# Patient Record
Sex: FEMALE | Race: WHITE | Employment: OTHER | URBAN - METROPOLITAN AREA
[De-identification: names, ages, dates, MRNs, and addresses within clinical notes are randomized per-mention and may not be internally consistent; named-entity substitution may affect disease eponyms.]

---

## 2017-01-10 LAB
ALBUMIN SERPL-MCNC: 4.4 G/DL (ref 3.2–5.3)
ALK PHOSPHATASE: 67 IU/L (ref 35–121)
ALT SERPL-CCNC: 17 IU/L (ref 5–59)
ANION GAP SERPL CALCULATED.3IONS-SCNC: 15 MMOL/L
AST SERPL-CCNC: 17 IU/L (ref 10–42)
BILIRUB SERPL-MCNC: 0.4 MG/DL (ref 0.2–1.3)
BUN BLDV-MCNC: 11 MG/DL (ref 10–20)
CALCIUM SERPL-MCNC: 9.8 MG/DL (ref 8.7–10.8)
CHLORIDE BLD-SCNC: 101 MMOL/L (ref 95–111)
CO2: 30 MMOL/L (ref 21–32)
CREAT SERPL-MCNC: 0.8 MG/DL (ref 0.5–1.3)
EGFR AFRICAN AMERICAN: 86
EGFR IF NONAFRICAN AMERICAN: 71
GLUCOSE: 122 MG/DL (ref 70–100)
POTASSIUM SERPL-SCNC: 4 MMOL/L (ref 3.5–5.4)
SODIUM BLD-SCNC: 142 MMOL/L (ref 134–147)
TOTAL PROTEIN: 6.9 G/DL (ref 5.8–8)

## 2017-07-05 ENCOUNTER — TELEPHONE (OUTPATIENT)
Dept: FAMILY MEDICINE CLINIC | Age: 72
End: 2017-07-05

## 2017-07-05 DIAGNOSIS — E78.00 HIGH CHOLESTEROL: Chronic | ICD-10-CM

## 2017-07-05 DIAGNOSIS — E03.9 ACQUIRED HYPOTHYROIDISM: Primary | Chronic | ICD-10-CM

## 2017-07-19 LAB
ABSOLUTE BASO #: 0.1 K/UL (ref 0–0.1)
ABSOLUTE EOS #: 0.2 K/UL (ref 0.1–0.4)
ABSOLUTE LYMPH #: 1.5 K/UL (ref 0.8–5.2)
ABSOLUTE MONO #: 0.6 K/UL (ref 0.1–0.9)
ABSOLUTE NEUT #: 4.4 K/UL (ref 1.3–9.1)
ALBUMIN SERPL-MCNC: 4.5 G/DL (ref 3.2–5.3)
ALK PHOSPHATASE: 62 IU/L (ref 35–121)
ALT SERPL-CCNC: 13 IU/L (ref 5–59)
ANION GAP SERPL CALCULATED.3IONS-SCNC: 11 MMOL/L
AST SERPL-CCNC: 14 IU/L (ref 10–42)
BASOPHILS RELATIVE PERCENT: 1.2 %
BILIRUB SERPL-MCNC: 0.5 MG/DL (ref 0.2–1.3)
BUN BLDV-MCNC: 11 MG/DL (ref 10–20)
CALCIUM SERPL-MCNC: 9.4 MG/DL (ref 8.7–10.8)
CHLORIDE BLD-SCNC: 105 MMOL/L (ref 95–111)
CHOLESTEROL/HDL RATIO: 3
CHOLESTEROL: 175 MG/DL
CO2: 29 MMOL/L (ref 21–32)
CREAT SERPL-MCNC: 0.8 MG/DL (ref 0.5–1.3)
EGFR AFRICAN AMERICAN: 86
EGFR IF NONAFRICAN AMERICAN: 71
EOSINOPHILS RELATIVE PERCENT: 2.4 %
GLUCOSE: 93 MG/DL (ref 70–100)
HCT VFR BLD CALC: 40.8 % (ref 36–48)
HDLC SERPL-MCNC: 59 MG/DL (ref 40–60)
HEMOGLOBIN: 13.5 G/DL (ref 12–16)
LDL CHOLESTEROL CALCULATED: 99 MG/DL
LDL/HDL RATIO: 1.7
LYMPHOCYTE %: 21.8 %
MCH RBC QN AUTO: 29.1 PG (ref 27–34)
MCHC RBC AUTO-ENTMCNC: 33.1 G/DL (ref 31–36)
MCV RBC AUTO: 87.9 FL (ref 80–100)
MONOCYTES # BLD: 8.7 %
NEUTROPHILS RELATIVE PERCENT: 65.6 %
PDW BLD-RTO: 13.1 % (ref 10.8–14.8)
PLATELETS: 236 K/UL (ref 150–450)
POTASSIUM SERPL-SCNC: 4.4 MMOL/L (ref 3.5–5.4)
RBC: 4.64 M/UL (ref 4–5.5)
SODIUM BLD-SCNC: 141 MMOL/L (ref 134–147)
T4 FREE: 0.96 NG/DL (ref 0.8–1.8)
TOTAL PROTEIN: 6.7 G/DL (ref 5.8–8)
TRIGL SERPL-MCNC: 87 MG/DL
TSH SERPL DL<=0.05 MIU/L-ACNC: 4.99 UIU/ML (ref 0.4–4.4)
VLDLC SERPL CALC-MCNC: 17 MG/DL
WBC: 6.8 K/UL (ref 3.7–10.8)

## 2017-07-20 ENCOUNTER — OFFICE VISIT (OUTPATIENT)
Dept: FAMILY MEDICINE CLINIC | Age: 72
End: 2017-07-20
Payer: MEDICARE

## 2017-07-20 VITALS
OXYGEN SATURATION: 97 % | WEIGHT: 217.6 LBS | BODY MASS INDEX: 34.15 KG/M2 | DIASTOLIC BLOOD PRESSURE: 72 MMHG | SYSTOLIC BLOOD PRESSURE: 134 MMHG | HEART RATE: 85 BPM | HEIGHT: 67 IN

## 2017-07-20 DIAGNOSIS — Z12.39 BREAST CANCER SCREENING: ICD-10-CM

## 2017-07-20 DIAGNOSIS — Z00.00 ROUTINE GENERAL MEDICAL EXAMINATION AT A HEALTH CARE FACILITY: ICD-10-CM

## 2017-07-20 DIAGNOSIS — Z12.31 ENCOUNTER FOR SCREENING MAMMOGRAM FOR MALIGNANT NEOPLASM OF BREAST: ICD-10-CM

## 2017-07-20 DIAGNOSIS — E03.9 ACQUIRED HYPOTHYROIDISM: Primary | ICD-10-CM

## 2017-07-20 PROCEDURE — G0439 PPPS, SUBSEQ VISIT: HCPCS | Performed by: FAMILY MEDICINE

## 2017-07-20 PROCEDURE — G0009 ADMIN PNEUMOCOCCAL VACCINE: HCPCS | Performed by: FAMILY MEDICINE

## 2017-07-20 PROCEDURE — 90732 PPSV23 VACC 2 YRS+ SUBQ/IM: CPT | Performed by: FAMILY MEDICINE

## 2017-07-20 ASSESSMENT — PATIENT HEALTH QUESTIONNAIRE - PHQ9: SUM OF ALL RESPONSES TO PHQ QUESTIONS 1-9: 0

## 2017-08-01 RX ORDER — PRAVASTATIN SODIUM 20 MG
20 TABLET ORAL NIGHTLY
Qty: 30 TABLET | Refills: 11 | Status: SHIPPED | OUTPATIENT
Start: 2017-08-01 | End: 2018-05-29 | Stop reason: SDUPTHER

## 2017-08-01 RX ORDER — LEVOTHYROXINE SODIUM 0.05 MG/1
50 TABLET ORAL DAILY
Qty: 30 TABLET | Refills: 11 | Status: SHIPPED | OUTPATIENT
Start: 2017-08-01 | End: 2018-05-29 | Stop reason: SDUPTHER

## 2018-01-16 DIAGNOSIS — Z12.39 BREAST CANCER SCREENING: ICD-10-CM

## 2018-01-16 DIAGNOSIS — Z12.31 ENCOUNTER FOR SCREENING MAMMOGRAM FOR MALIGNANT NEOPLASM OF BREAST: ICD-10-CM

## 2018-02-01 LAB
T4 FREE: 0.96 NG/DL (ref 0.8–1.8)
TSH SERPL DL<=0.05 MIU/L-ACNC: 5.51 UIU/ML (ref 0.4–4.4)

## 2018-02-02 DIAGNOSIS — E03.9 ACQUIRED HYPOTHYROIDISM: Primary | Chronic | ICD-10-CM

## 2018-03-15 ENCOUNTER — TELEPHONE (OUTPATIENT)
Dept: FAMILY MEDICINE CLINIC | Age: 73
End: 2018-03-15

## 2018-05-29 RX ORDER — LEVOTHYROXINE SODIUM 0.05 MG/1
TABLET ORAL
Qty: 90 TABLET | Refills: 10 | Status: SHIPPED | OUTPATIENT
Start: 2018-05-29 | End: 2019-05-28 | Stop reason: SDUPTHER

## 2018-05-29 RX ORDER — PRAVASTATIN SODIUM 20 MG
TABLET ORAL
Qty: 90 TABLET | Refills: 10 | Status: SHIPPED | OUTPATIENT
Start: 2018-05-29 | End: 2019-07-29 | Stop reason: SDUPTHER

## 2018-06-28 ENCOUNTER — OFFICE VISIT (OUTPATIENT)
Dept: FAMILY MEDICINE CLINIC | Age: 73
End: 2018-06-28
Payer: MEDICARE

## 2018-06-28 VITALS
DIASTOLIC BLOOD PRESSURE: 80 MMHG | WEIGHT: 213.5 LBS | OXYGEN SATURATION: 96 % | BODY MASS INDEX: 33.19 KG/M2 | SYSTOLIC BLOOD PRESSURE: 134 MMHG | HEART RATE: 94 BPM

## 2018-06-28 DIAGNOSIS — M25.511 ACUTE PAIN OF RIGHT SHOULDER: Primary | ICD-10-CM

## 2018-06-28 DIAGNOSIS — E03.9 ACQUIRED HYPOTHYROIDISM: ICD-10-CM

## 2018-06-28 PROCEDURE — 99213 OFFICE O/P EST LOW 20 MIN: CPT | Performed by: FAMILY MEDICINE

## 2018-06-28 PROCEDURE — G8427 DOCREV CUR MEDS BY ELIG CLIN: HCPCS | Performed by: FAMILY MEDICINE

## 2018-06-28 PROCEDURE — 4040F PNEUMOC VAC/ADMIN/RCVD: CPT | Performed by: FAMILY MEDICINE

## 2018-06-28 PROCEDURE — 1090F PRES/ABSN URINE INCON ASSESS: CPT | Performed by: FAMILY MEDICINE

## 2018-06-28 PROCEDURE — 1123F ACP DISCUSS/DSCN MKR DOCD: CPT | Performed by: FAMILY MEDICINE

## 2018-06-28 PROCEDURE — G8399 PT W/DXA RESULTS DOCUMENT: HCPCS | Performed by: FAMILY MEDICINE

## 2018-06-28 PROCEDURE — 3017F COLORECTAL CA SCREEN DOC REV: CPT | Performed by: FAMILY MEDICINE

## 2018-06-28 PROCEDURE — G8417 CALC BMI ABV UP PARAM F/U: HCPCS | Performed by: FAMILY MEDICINE

## 2018-06-28 PROCEDURE — 1036F TOBACCO NON-USER: CPT | Performed by: FAMILY MEDICINE

## 2018-06-30 LAB — T4 FREE: 1.13 NG/DL (ref 0.8–1.9)

## 2018-08-28 ENCOUNTER — TELEPHONE (OUTPATIENT)
Dept: FAMILY MEDICINE CLINIC | Age: 73
End: 2018-08-28

## 2018-08-31 NOTE — TELEPHONE ENCOUNTER
Patient was informed that forms are being mailed out today for her to fill out. Once the forms are filled out she is to being them into the office. Once we receive the forms then we can schedule her appointment. Patient verbally understood.

## 2018-09-18 ENCOUNTER — OFFICE VISIT (OUTPATIENT)
Dept: FAMILY MEDICINE CLINIC | Age: 73
End: 2018-09-18
Payer: MEDICARE

## 2018-09-18 VITALS
HEART RATE: 101 BPM | SYSTOLIC BLOOD PRESSURE: 130 MMHG | WEIGHT: 210 LBS | OXYGEN SATURATION: 96 % | BODY MASS INDEX: 33.75 KG/M2 | HEIGHT: 66 IN | DIASTOLIC BLOOD PRESSURE: 80 MMHG

## 2018-09-18 DIAGNOSIS — E03.9 ACQUIRED HYPOTHYROIDISM: ICD-10-CM

## 2018-09-18 DIAGNOSIS — E78.5 HYPERLIPIDEMIA WITH TARGET LDL LESS THAN 130: ICD-10-CM

## 2018-09-18 DIAGNOSIS — Z00.00 WELL ADULT EXAM: Primary | ICD-10-CM

## 2018-09-18 PROCEDURE — G0439 PPPS, SUBSEQ VISIT: HCPCS | Performed by: FAMILY MEDICINE

## 2018-09-18 PROCEDURE — 4040F PNEUMOC VAC/ADMIN/RCVD: CPT | Performed by: FAMILY MEDICINE

## 2018-09-18 RX ORDER — MULTIVITAMIN WITH IRON
250 TABLET ORAL DAILY
COMMUNITY
End: 2019-08-26

## 2018-09-22 LAB
ABSOLUTE BASO #: 0.1 K/UL (ref 0–0.1)
ABSOLUTE EOS #: 0.2 K/UL (ref 0.1–0.4)
ABSOLUTE LYMPH #: 1.7 K/UL (ref 0.8–5.2)
ABSOLUTE MONO #: 0.7 K/UL (ref 0.1–0.9)
ABSOLUTE NEUT #: 4.4 K/UL (ref 1.3–9.1)
ALBUMIN SERPL-MCNC: 4.2 G/DL (ref 3.5–5.2)
ALK PHOSPHATASE: 61 U/L (ref 30–146)
ALT SERPL-CCNC: 12 U/L (ref 5–40)
ANION GAP SERPL CALCULATED.3IONS-SCNC: 15 MEQ/L (ref 10–19)
AST SERPL-CCNC: 16 U/L (ref 9–40)
BASOPHILS RELATIVE PERCENT: 0.9 %
BILIRUB SERPL-MCNC: 0.4 MG/DL
BUN BLDV-MCNC: 15 MG/DL (ref 8–23)
CALCIUM SERPL-MCNC: 9.1 MG/DL (ref 8.5–10.5)
CHLORIDE BLD-SCNC: 102 MEQ/L (ref 95–107)
CHOLESTEROL/HDL RATIO: 3.6
CHOLESTEROL: 195 MG/DL
CO2: 26 MEQ/L (ref 19–31)
CREAT SERPL-MCNC: 0.8 MG/DL (ref 0.6–1.3)
EGFR AFRICAN AMERICAN: 86 ML/MIN/1.73 M2
EGFR IF NONAFRICAN AMERICAN: 74.2 ML/MIN/1.73 M2
EOSINOPHILS RELATIVE PERCENT: 2.2 %
GLUCOSE: 92 MG/DL (ref 70–99)
HCT VFR BLD CALC: 39.4 % (ref 36–48)
HDLC SERPL-MCNC: 54.3 MG/DL
HEMOGLOBIN: 13.6 G/DL (ref 12–16)
LDL CHOLESTEROL CALCULATED: 120 MG/DL
LDL/HDL RATIO: 2.2
LYMPHOCYTE %: 24.8 %
MCH RBC QN AUTO: 30 PG (ref 27–34)
MCHC RBC AUTO-ENTMCNC: 34.5 G/DL (ref 31–36)
MCV RBC AUTO: 87 FL (ref 80–100)
MONOCYTES # BLD: 9.6 %
NEUTROPHILS RELATIVE PERCENT: 62.4 %
PDW BLD-RTO: 13.1 % (ref 10.8–14.8)
PLATELETS: 236 K/UL (ref 150–450)
POTASSIUM SERPL-SCNC: 4.4 MEQ/L (ref 3.5–5.4)
RBC: 4.53 M/UL (ref 4–5.5)
SODIUM BLD-SCNC: 143 MEQ/L (ref 135–146)
T4 FREE: 1.26 NG/DL (ref 0.8–1.9)
TOTAL PROTEIN: 6.6 G/DL (ref 6.1–8.3)
TRIGL SERPL-MCNC: 106 MG/DL
VLDLC SERPL CALC-MCNC: 21 MG/DL
WBC: 7 K/UL (ref 3.7–10.8)

## 2019-05-28 RX ORDER — LEVOTHYROXINE SODIUM 0.05 MG/1
TABLET ORAL
Qty: 90 TABLET | Refills: 10 | Status: SHIPPED | OUTPATIENT
Start: 2019-05-28 | End: 2019-07-29 | Stop reason: SDUPTHER

## 2019-07-29 NOTE — TELEPHONE ENCOUNTER
Patient request medication refill. Patient is out of 1 medication. Patient set up apt for Mon 8/05/19; Dr Montano's soonest available. Last OV: 6/28/18  Next OV: 8/05/19    Patient also request an order for lab work to be completed before upcoming apt on Mon 8/05/19. Please call patient with update.

## 2019-07-30 RX ORDER — LEVOTHYROXINE SODIUM 0.05 MG/1
TABLET ORAL
Qty: 90 TABLET | Refills: 0 | Status: SHIPPED | OUTPATIENT
Start: 2019-07-30 | End: 2020-02-05 | Stop reason: SDUPTHER

## 2019-07-30 RX ORDER — PRAVASTATIN SODIUM 20 MG
TABLET ORAL
Qty: 90 TABLET | Refills: 0 | Status: SHIPPED | OUTPATIENT
Start: 2019-07-30 | End: 2019-10-29 | Stop reason: SDUPTHER

## 2019-07-30 NOTE — TELEPHONE ENCOUNTER
Next Visit Date:  Future Appointments   Date Time Provider Matilde Maravillai   8/5/2019  2:40 PM Srinivasa Chavez  [address] [facility]   Topic Date Due    DTaP/Tdap/Td vaccine (1 - Tdap) 02/21/1966    Shingles Vaccine (1 of 2) 02/21/1997    Pneumococcal 65+ years Vaccine (2 of 2 - PCV13) 07/20/2018    TSH testing  02/01/2019    Flu vaccine (1) 09/30/2019 (Originally 9/1/2019)    Annual Wellness Visit (AWV)  09/18/2019    Colon cancer screen colonoscopy  01/01/2020    Breast cancer screen  01/04/2020    Lipid screen  09/21/2023    DEXA (modify frequency per FRAX score)  Completed    Hepatitis C screen  Completed       No results found for: LABA1C          ( goal A1C is < 7)   No results found for: LABMICR  LDL Cholesterol (mg/dL)   Date Value   06/15/2012 139 (H)     LDL Calculated (mg/dL)   Date Value   09/21/2018 120   07/18/2017 99       (goal LDL is <100)   AST (U/L)   Date Value   09/21/2018 16     ALT (U/L)   Date Value   09/21/2018 12     BUN (mg/dL)   Date Value   09/21/2018 15     BP Readings from Last 3 Encounters:   09/18/18 130/80   06/28/18 134/80   07/20/17 134/72          (goal 120/80)    All Future Testing planned in [facility]  Lab Frequency Next Occurrence   CBC Auto Differential Once 09/18/2018   Comprehensive Metabolic Panel Once 60/98/8227   Lipid Panel Once 09/18/2018   T4, Free Once 09/18/2018               Patient Active Problem List:     High cholesterol     Hyperlipidemia with target LDL less than 130     Acquired hypothyroidism

## 2019-08-05 ENCOUNTER — OFFICE VISIT (OUTPATIENT)
Dept: FAMILY MEDICINE CLINIC | Age: 74
End: 2019-08-05
Payer: MEDICARE

## 2019-08-05 VITALS
OXYGEN SATURATION: 93 % | RESPIRATION RATE: 18 BRPM | DIASTOLIC BLOOD PRESSURE: 70 MMHG | WEIGHT: 213.8 LBS | HEART RATE: 78 BPM | BODY MASS INDEX: 34.51 KG/M2 | SYSTOLIC BLOOD PRESSURE: 132 MMHG

## 2019-08-05 DIAGNOSIS — E03.9 ACQUIRED HYPOTHYROIDISM: ICD-10-CM

## 2019-08-05 DIAGNOSIS — E78.5 HYPERLIPIDEMIA WITH TARGET LDL LESS THAN 130: Primary | ICD-10-CM

## 2019-08-05 PROCEDURE — 3017F COLORECTAL CA SCREEN DOC REV: CPT | Performed by: FAMILY MEDICINE

## 2019-08-05 PROCEDURE — 1123F ACP DISCUSS/DSCN MKR DOCD: CPT | Performed by: FAMILY MEDICINE

## 2019-08-05 PROCEDURE — 4040F PNEUMOC VAC/ADMIN/RCVD: CPT | Performed by: FAMILY MEDICINE

## 2019-08-05 PROCEDURE — 90471 IMMUNIZATION ADMIN: CPT | Performed by: FAMILY MEDICINE

## 2019-08-05 PROCEDURE — 1090F PRES/ABSN URINE INCON ASSESS: CPT | Performed by: FAMILY MEDICINE

## 2019-08-05 PROCEDURE — G8427 DOCREV CUR MEDS BY ELIG CLIN: HCPCS | Performed by: FAMILY MEDICINE

## 2019-08-05 PROCEDURE — G8417 CALC BMI ABV UP PARAM F/U: HCPCS | Performed by: FAMILY MEDICINE

## 2019-08-05 PROCEDURE — 99214 OFFICE O/P EST MOD 30 MIN: CPT | Performed by: FAMILY MEDICINE

## 2019-08-05 PROCEDURE — G8399 PT W/DXA RESULTS DOCUMENT: HCPCS | Performed by: FAMILY MEDICINE

## 2019-08-05 PROCEDURE — 90715 TDAP VACCINE 7 YRS/> IM: CPT | Performed by: FAMILY MEDICINE

## 2019-08-05 PROCEDURE — 1036F TOBACCO NON-USER: CPT | Performed by: FAMILY MEDICINE

## 2019-08-05 ASSESSMENT — PATIENT HEALTH QUESTIONNAIRE - PHQ9
SUM OF ALL RESPONSES TO PHQ QUESTIONS 1-9: 0
2. FEELING DOWN, DEPRESSED OR HOPELESS: 0
SUM OF ALL RESPONSES TO PHQ9 QUESTIONS 1 & 2: 0
1. LITTLE INTEREST OR PLEASURE IN DOING THINGS: 0
SUM OF ALL RESPONSES TO PHQ QUESTIONS 1-9: 0

## 2019-08-19 LAB
ABSOLUTE BASO #: 0.1 X10E9/L (ref 0–0.9)
ABSOLUTE EOS #: 0.2 X10E9/L (ref 0–0.4)
ABSOLUTE LYMPH #: 1.3 X10E9/L (ref 1–3.5)
ABSOLUTE MONO #: 0.6 X10E9/L (ref 0–0.9)
ABSOLUTE NEUT #: 3.4 X10E9/L (ref 1.5–6.6)
ALBUMIN SERPL-MCNC: 4 G/DL (ref 3.2–5.3)
ALK PHOSPHATASE: 50 U/L (ref 39–130)
ALT SERPL-CCNC: 13 U/L (ref 0–31)
ANION GAP SERPL CALCULATED.3IONS-SCNC: 9 MMOL/L (ref 4–12)
AST SERPL-CCNC: 14 U/L (ref 0–41)
BASOPHILS RELATIVE PERCENT: 1.8 %
BILIRUB SERPL-MCNC: 0.5 MG/DL (ref 0.3–1.2)
BUN BLDV-MCNC: 12 MG/DL (ref 5–27)
CALCIUM SERPL-MCNC: 8.5 MG/DL (ref 8.5–10.5)
CHLORIDE BLD-SCNC: 105 MMOL/L (ref 98–109)
CHOLESTEROL/HDL RATIO: 3.7 (ref 1–5)
CHOLESTEROL: 204 MG/DL (ref 150–200)
CO2: 27 MMOL/L (ref 22–32)
CREAT SERPL-MCNC: 0.81 MG/DL (ref 0.4–1)
EGFR AFRICAN AMERICAN: >60 ML/MIN/1.73SQ.M
EGFR IF NONAFRICAN AMERICAN: >60 ML/MIN/1.73SQ.M
EOSINOPHILS RELATIVE PERCENT: 2.9 %
GLUCOSE: 87 MG/DL (ref 65–99)
HCT VFR BLD CALC: 41.6 % (ref 35–47)
HDLC SERPL-MCNC: 55 MG/DL
HEMOGLOBIN: 13.8 G/DL (ref 11.7–16.1)
LDL CHOLESTEROL CALCULATED: 123 MG/DL
LDL/HDL RATIO: 2.2
LYMPHOCYTE %: 23.8 %
MCH RBC QN AUTO: 30.5 PG (ref 27–35)
MCHC RBC AUTO-ENTMCNC: 33.3 G/DL (ref 31–36)
MCV RBC AUTO: 91 FL (ref 81–101)
MONOCYTES # BLD: 10.6 %
NEUTROPHILS RELATIVE PERCENT: 60.9 %
PDW BLD-RTO: 14.2 % (ref 11.5–14.7)
PLATELETS: 203 X10E9/L (ref 150–450)
PMV BLD AUTO: 9.3 FL (ref 7–12)
POTASSIUM SERPL-SCNC: 4.4 MMOL/L (ref 3.5–5)
RBC: 4.55 X10E12/L (ref 3.55–5.2)
SODIUM BLD-SCNC: 141 MMOL/L (ref 134–146)
T4 FREE: 0.98 NG/DL (ref 0.61–1.6)
TOTAL PROTEIN: 6.1 G/DL (ref 6–8)
TRIGL SERPL-MCNC: 130 MG/DL (ref 27–150)
TSH SERPL DL<=0.05 MIU/L-ACNC: 4.91 UIU/ML (ref 0.49–4.67)
VLDLC SERPL CALC-MCNC: 26 MG/DL (ref 0–30)
WBC: 5.5 X10E9/L (ref 4–11.8)

## 2019-08-26 ENCOUNTER — OFFICE VISIT (OUTPATIENT)
Dept: FAMILY MEDICINE CLINIC | Age: 74
End: 2019-08-26
Payer: MEDICARE

## 2019-08-26 VITALS
SYSTOLIC BLOOD PRESSURE: 126 MMHG | BODY MASS INDEX: 34.72 KG/M2 | OXYGEN SATURATION: 97 % | HEART RATE: 86 BPM | DIASTOLIC BLOOD PRESSURE: 80 MMHG | WEIGHT: 215.13 LBS

## 2019-08-26 DIAGNOSIS — R20.0 NUMBNESS OF LEFT HAND: Primary | ICD-10-CM

## 2019-08-26 PROCEDURE — 1090F PRES/ABSN URINE INCON ASSESS: CPT | Performed by: FAMILY MEDICINE

## 2019-08-26 PROCEDURE — G8399 PT W/DXA RESULTS DOCUMENT: HCPCS | Performed by: FAMILY MEDICINE

## 2019-08-26 PROCEDURE — 99213 OFFICE O/P EST LOW 20 MIN: CPT | Performed by: FAMILY MEDICINE

## 2019-08-26 PROCEDURE — 1036F TOBACCO NON-USER: CPT | Performed by: FAMILY MEDICINE

## 2019-08-26 PROCEDURE — G8427 DOCREV CUR MEDS BY ELIG CLIN: HCPCS | Performed by: FAMILY MEDICINE

## 2019-08-26 PROCEDURE — 4040F PNEUMOC VAC/ADMIN/RCVD: CPT | Performed by: FAMILY MEDICINE

## 2019-08-26 PROCEDURE — G8417 CALC BMI ABV UP PARAM F/U: HCPCS | Performed by: FAMILY MEDICINE

## 2019-08-26 PROCEDURE — 1123F ACP DISCUSS/DSCN MKR DOCD: CPT | Performed by: FAMILY MEDICINE

## 2019-08-26 PROCEDURE — 3017F COLORECTAL CA SCREEN DOC REV: CPT | Performed by: FAMILY MEDICINE

## 2019-09-04 ENCOUNTER — PATIENT MESSAGE (OUTPATIENT)
Dept: FAMILY MEDICINE CLINIC | Age: 74
End: 2019-09-04

## 2019-09-04 DIAGNOSIS — G81.92 HEMIPARESIS OF LEFT DOMINANT SIDE, UNSPECIFIED HEMIPARESIS ETIOLOGY (HCC): Primary | ICD-10-CM

## 2019-09-04 DIAGNOSIS — R09.89 OTHER SPECIFIED SYMPTOMS AND SIGNS INVOLVING THE CIRCULATORY AND RESPIRATORY SYSTEMS: ICD-10-CM

## 2019-09-06 ENCOUNTER — PATIENT MESSAGE (OUTPATIENT)
Dept: FAMILY MEDICINE CLINIC | Age: 74
End: 2019-09-06

## 2019-09-06 NOTE — TELEPHONE ENCOUNTER
Odd set of symptoms. Neurologic vs. Vascular issues here. Labs look ok.     I have ordered mri of the brain and UE arterial dopplers

## 2019-09-12 ENCOUNTER — PATIENT MESSAGE (OUTPATIENT)
Dept: FAMILY MEDICINE CLINIC | Age: 74
End: 2019-09-12

## 2019-09-13 ENCOUNTER — PATIENT MESSAGE (OUTPATIENT)
Dept: FAMILY MEDICINE CLINIC | Age: 74
End: 2019-09-13

## 2019-09-16 ENCOUNTER — PATIENT MESSAGE (OUTPATIENT)
Dept: FAMILY MEDICINE CLINIC | Age: 74
End: 2019-09-16

## 2019-09-16 NOTE — TELEPHONE ENCOUNTER
From: Tamika Bunch  To: Kashmir Sterling MD  Sent: 9/16/2019 8:09 AM EDT  Subject: Test Results Question    Since my last test results (vascular) came back normal, what is my next step? You mentioned MRI, should I try to schedule an open MRI or is there a less clostrphobic test available? I want answers to my tingling hand and arms and will do whatever is necessary. Please advise.

## 2019-09-16 NOTE — TELEPHONE ENCOUNTER
Refer this patient to neuro, she has tingling of her toes at times - dosen't make much anatomical sense.      She tells me she can 'skake it off'

## 2019-09-17 ENCOUNTER — PATIENT MESSAGE (OUTPATIENT)
Dept: FAMILY MEDICINE CLINIC | Age: 74
End: 2019-09-17

## 2019-09-17 DIAGNOSIS — R20.2 TINGLING OF BOTH UPPER EXTREMITIES: ICD-10-CM

## 2019-09-17 DIAGNOSIS — R20.2 NUMBNESS AND TINGLING IN BOTH HANDS: Primary | ICD-10-CM

## 2019-09-17 DIAGNOSIS — R20.2 TINGLING OF BOTH FEET: ICD-10-CM

## 2019-09-17 DIAGNOSIS — R20.0 NUMBNESS AND TINGLING IN BOTH HANDS: Primary | ICD-10-CM

## 2019-09-17 NOTE — TELEPHONE ENCOUNTER
From: [employer]  To: Yanni Jones MD  Sent: 9/17/2019 11:35 AM EDT  Subject: Visit Follow-Up Question    Since I do not know any neurologists, I will trust Dr. Margot Baum recommendation of Dr. Nasrin Mai. Please set up an appointment for me. Thank you.

## 2019-09-26 ENCOUNTER — OFFICE VISIT (OUTPATIENT)
Dept: NEUROLOGY | Age: 74
End: 2019-09-26
Payer: MEDICARE

## 2019-09-26 VITALS
HEIGHT: 67 IN | BODY MASS INDEX: 33.78 KG/M2 | DIASTOLIC BLOOD PRESSURE: 75 MMHG | WEIGHT: 215.2 LBS | HEART RATE: 90 BPM | SYSTOLIC BLOOD PRESSURE: 141 MMHG

## 2019-09-26 DIAGNOSIS — G56.03 BILATERAL CARPAL TUNNEL SYNDROME: Primary | ICD-10-CM

## 2019-09-26 PROCEDURE — 99204 OFFICE O/P NEW MOD 45 MIN: CPT | Performed by: PSYCHIATRY & NEUROLOGY

## 2019-09-26 PROCEDURE — G8417 CALC BMI ABV UP PARAM F/U: HCPCS | Performed by: PSYCHIATRY & NEUROLOGY

## 2019-09-26 PROCEDURE — 3017F COLORECTAL CA SCREEN DOC REV: CPT | Performed by: PSYCHIATRY & NEUROLOGY

## 2019-09-26 PROCEDURE — G8427 DOCREV CUR MEDS BY ELIG CLIN: HCPCS | Performed by: PSYCHIATRY & NEUROLOGY

## 2019-09-26 PROCEDURE — 1036F TOBACCO NON-USER: CPT | Performed by: PSYCHIATRY & NEUROLOGY

## 2019-09-26 PROCEDURE — 1123F ACP DISCUSS/DSCN MKR DOCD: CPT | Performed by: PSYCHIATRY & NEUROLOGY

## 2019-09-26 PROCEDURE — 1090F PRES/ABSN URINE INCON ASSESS: CPT | Performed by: PSYCHIATRY & NEUROLOGY

## 2019-09-26 PROCEDURE — 4040F PNEUMOC VAC/ADMIN/RCVD: CPT | Performed by: PSYCHIATRY & NEUROLOGY

## 2019-09-26 PROCEDURE — G8399 PT W/DXA RESULTS DOCUMENT: HCPCS | Performed by: PSYCHIATRY & NEUROLOGY

## 2019-09-26 RX ORDER — MULTIVITAMIN WITH IRON
250 TABLET ORAL DAILY
COMMUNITY
End: 2020-02-05

## 2019-09-26 ASSESSMENT — ENCOUNTER SYMPTOMS
DIARRHEA: 1
RESPIRATORY NEGATIVE: 1
ALLERGIC/IMMUNOLOGIC NEGATIVE: 1
EYES NEGATIVE: 1

## 2019-09-26 NOTE — PROGRESS NOTES
Subjective:      Patient ID: Harjinder Wright is a 69 y.o. female. HPI  70 yo with hand nubmness . The condition is she reports that in August she began awakening from sleep with numbness of both hands left hand more than right. Numbness will affect thumb and 2nd to 4th fingers awakening either during night or in morning with numbness complaint . Sleeping propped up with pilows with extended arms bilaterally may help . There will be also hand aching pain on awakening and subjective hand weakness . Numbness may go to forearms having some attenuation of hand numbness by shaking this off . Over the las 3 weeks during the day with gardening manual activity there will be hand stiffness . There is maybe at times neck pain . Testing bilateral upper  extremity arterial dopplers normal , September 2019      Past Medical History:   Diagnosis Date    Arthritis     High cholesterol     High cholesterol     Hypothyroid     Neuropathy        History reviewed. No pertinent surgical history. History reviewed. No pertinent family history. Social History     Socioeconomic History    Marital status:       Spouse name: None    Number of children: None    Years of education: None    Highest education level: None   Occupational History    None   Social Needs    Financial resource strain: None    Food insecurity:     Worry: None     Inability: None    Transportation needs:     Medical: None     Non-medical: None   Tobacco Use    Smoking status: Never Smoker    Smokeless tobacco: Never Used   Substance and Sexual Activity    Alcohol use: Yes     Comment: occasionally    Drug use: No    Sexual activity: Never   Lifestyle    Physical activity:     Days per week: None     Minutes per session: None    Stress: None   Relationships    Social connections:     Talks on phone: None     Gets together: None     Attends Christianity service: None     Active member of club or organization: None     Attends meetings of clubs or organizations: None     Relationship status: None    Intimate partner violence:     Fear of current or ex partner: None     Emotionally abused: None     Physically abused: None     Forced sexual activity: None   Other Topics Concern    None   Social History Narrative    None       Current Outpatient Medications   Medication Sig Dispense Refill    magnesium (MAGNESIUM-OXIDE) 250 MG TABS tablet Take 250 mg by mouth daily      pravastatin (PRAVACHOL) 20 MG tablet TAKE ONE TABLET BY MOUTH ONCE NIGHTLY 90 tablet 0    levothyroxine (SYNTHROID) 50 MCG tablet TAKE ONE TABLET BY MOUTH DAILY 90 tablet 0    Glucosamine HCl 1000 MG TABS Take 1 tablet by mouth daily      Psyllium (METAMUCIL PO) Take 1 tablet by mouth daily 1 TBS daily       No current facility-administered medications for this visit. No Known Allergies    Review of Systems   Constitutional: Positive for unexpected weight change. HENT: Positive for tinnitus. Eyes: Negative. Respiratory: Negative. Cardiovascular: Negative. Gastrointestinal: Positive for diarrhea. Endocrine: Negative. Genitourinary: Negative. Musculoskeletal: Negative. Skin: Negative. Allergic/Immunologic: Negative. Neurological: Negative. Hematological: Negative. Psychiatric/Behavioral: Negative. Objective:   Physical Exam  Vitals:    09/26/19 1439   BP: (!) 141/75   Pulse: 90     weight: 215 lb 3.2 oz (97.6 kg)     Positive left Tinel's at wrist   Neurological Examination  Constitutional .General exam well groomed   Head/Ears /Nose/Throat: external ear . Normal exam  Neck and thyroid . Normal size. No bruits  Respiratory . Breathsounds clear bilaterally  Cardiovascular:  Auscultation of heart with regular rate and rhythm  Musculoskeletal. Muscle bulk and tone normal                                                           Muscle strength 5/5 strength throughout

## 2019-09-26 NOTE — LETTER
 Smoking status: Never Smoker    Smokeless tobacco: Never Used   Substance and Sexual Activity    Alcohol use: Yes     Comment: occasionally    Drug use: No    Sexual activity: Never   Lifestyle    Physical activity:     Days per week: None     Minutes per session: None    Stress: None   Relationships    Social connections:     Talks on phone: None     Gets together: None     Attends Jew service: None     Active member of club or organization: None     Attends meetings of clubs or organizations: None     Relationship status: None    Intimate partner violence:     Fear of current or ex partner: None     Emotionally abused: None     Physically abused: None     Forced sexual activity: None   Other Topics Concern    None   Social History Narrative    None       Current Outpatient Medications   Medication Sig Dispense Refill    magnesium (MAGNESIUM-OXIDE) 250 MG TABS tablet Take 250 mg by mouth daily      pravastatin (PRAVACHOL) 20 MG tablet TAKE ONE TABLET BY MOUTH ONCE NIGHTLY 90 tablet 0    levothyroxine (SYNTHROID) 50 MCG tablet TAKE ONE TABLET BY MOUTH DAILY 90 tablet 0    Glucosamine HCl 1000 MG TABS Take 1 tablet by mouth daily      Psyllium (METAMUCIL PO) Take 1 tablet by mouth daily 1 TBS daily       No current facility-administered medications for this visit. No Known Allergies    Review of Systems   Constitutional: Positive for unexpected weight change. HENT: Positive for tinnitus. Eyes: Negative. Respiratory: Negative. Cardiovascular: Negative. Gastrointestinal: Positive for diarrhea. Endocrine: Negative. Genitourinary: Negative. Musculoskeletal: Negative. Skin: Negative. Allergic/Immunologic: Negative. Neurological: Negative. Hematological: Negative. Psychiatric/Behavioral: Negative.         Objective:   Physical Exam  Vitals:    09/26/19 1439   BP: (!) 141/75   Pulse: 90     weight: 215 lb 3.2 oz (97.6 kg)     Positive left Tinel's at wrist

## 2019-09-27 NOTE — COMMUNICATION BODY
clubs or organizations: None     Relationship status: None    Intimate partner violence:     Fear of current or ex partner: None     Emotionally abused: None     Physically abused: None     Forced sexual activity: None   Other Topics Concern    None   Social History Narrative    None       Current Outpatient Medications   Medication Sig Dispense Refill    magnesium (MAGNESIUM-OXIDE) 250 MG TABS tablet Take 250 mg by mouth daily      pravastatin (PRAVACHOL) 20 MG tablet TAKE ONE TABLET BY MOUTH ONCE NIGHTLY 90 tablet 0    levothyroxine (SYNTHROID) 50 MCG tablet TAKE ONE TABLET BY MOUTH DAILY 90 tablet 0    Glucosamine HCl 1000 MG TABS Take 1 tablet by mouth daily      Psyllium (METAMUCIL PO) Take 1 tablet by mouth daily 1 TBS daily       No current facility-administered medications for this visit. No Known Allergies    Review of Systems   Constitutional: Positive for unexpected weight change. HENT: Positive for tinnitus. Eyes: Negative. Respiratory: Negative. Cardiovascular: Negative. Gastrointestinal: Positive for diarrhea. Endocrine: Negative. Genitourinary: Negative. Musculoskeletal: Negative. Skin: Negative. Allergic/Immunologic: Negative. Neurological: Negative. Hematological: Negative. Psychiatric/Behavioral: Negative. Objective:   Physical Exam  Vitals:    09/26/19 1439   BP: (!) 141/75   Pulse: 90     weight: 215 lb 3.2 oz (97.6 kg)     Positive left Tinel's at wrist   Neurological Examination  Constitutional .General exam well groomed   Head/Ears /Nose/Throat: external ear . Normal exam  Neck and thyroid . Normal size. No bruits  Respiratory . Breathsounds clear bilaterally  Cardiovascular:  Auscultation of heart with regular rate and rhythm  Musculoskeletal. Muscle bulk and tone normal                                                           Muscle strength 5/5 strength throughout

## 2019-10-03 ENCOUNTER — HOSPITAL ENCOUNTER (OUTPATIENT)
Facility: CLINIC | Age: 74
Discharge: HOME | End: 2019-10-05
Payer: MEDICARE

## 2019-10-03 ENCOUNTER — HOSPITAL ENCOUNTER (OUTPATIENT)
Dept: GENERAL RADIOLOGY | Facility: CLINIC | Age: 74
Discharge: HOME | End: 2019-10-05
Payer: MEDICARE

## 2019-10-03 DIAGNOSIS — G56.03 BILATERAL CARPAL TUNNEL SYNDROME: ICD-10-CM

## 2019-10-03 PROCEDURE — 72040 X-RAY EXAM NECK SPINE 2-3 VW: CPT

## 2019-10-29 RX ORDER — PRAVASTATIN SODIUM 20 MG
TABLET ORAL
Qty: 90 TABLET | Refills: 0 | Status: SHIPPED | OUTPATIENT
Start: 2019-10-29 | End: 2020-01-27

## 2019-11-25 DIAGNOSIS — G56.03 BILATERAL CARPAL TUNNEL SYNDROME: ICD-10-CM

## 2019-11-27 ENCOUNTER — TELEPHONE (OUTPATIENT)
Dept: NEUROLOGY | Age: 74
End: 2019-11-27

## 2019-12-02 ENCOUNTER — OFFICE VISIT (OUTPATIENT)
Dept: NEUROLOGY | Age: 74
End: 2019-12-02
Payer: MEDICARE

## 2019-12-02 VITALS
SYSTOLIC BLOOD PRESSURE: 133 MMHG | DIASTOLIC BLOOD PRESSURE: 75 MMHG | HEIGHT: 67 IN | WEIGHT: 210 LBS | HEART RATE: 87 BPM | BODY MASS INDEX: 32.96 KG/M2

## 2019-12-02 DIAGNOSIS — G56.03 BILATERAL CARPAL TUNNEL SYNDROME: Primary | ICD-10-CM

## 2019-12-02 PROCEDURE — G8484 FLU IMMUNIZE NO ADMIN: HCPCS | Performed by: PSYCHIATRY & NEUROLOGY

## 2019-12-02 PROCEDURE — G8417 CALC BMI ABV UP PARAM F/U: HCPCS | Performed by: PSYCHIATRY & NEUROLOGY

## 2019-12-02 PROCEDURE — 99214 OFFICE O/P EST MOD 30 MIN: CPT | Performed by: PSYCHIATRY & NEUROLOGY

## 2019-12-02 PROCEDURE — 1090F PRES/ABSN URINE INCON ASSESS: CPT | Performed by: PSYCHIATRY & NEUROLOGY

## 2019-12-02 PROCEDURE — G8399 PT W/DXA RESULTS DOCUMENT: HCPCS | Performed by: PSYCHIATRY & NEUROLOGY

## 2019-12-02 PROCEDURE — 3017F COLORECTAL CA SCREEN DOC REV: CPT | Performed by: PSYCHIATRY & NEUROLOGY

## 2019-12-02 PROCEDURE — 1036F TOBACCO NON-USER: CPT | Performed by: PSYCHIATRY & NEUROLOGY

## 2019-12-02 PROCEDURE — 1123F ACP DISCUSS/DSCN MKR DOCD: CPT | Performed by: PSYCHIATRY & NEUROLOGY

## 2019-12-02 PROCEDURE — G8427 DOCREV CUR MEDS BY ELIG CLIN: HCPCS | Performed by: PSYCHIATRY & NEUROLOGY

## 2019-12-02 PROCEDURE — 4040F PNEUMOC VAC/ADMIN/RCVD: CPT | Performed by: PSYCHIATRY & NEUROLOGY

## 2019-12-02 ASSESSMENT — ENCOUNTER SYMPTOMS
EYES NEGATIVE: 1
RESPIRATORY NEGATIVE: 1
ALLERGIC/IMMUNOLOGIC NEGATIVE: 1
GASTROINTESTINAL NEGATIVE: 1

## 2020-01-27 RX ORDER — PRAVASTATIN SODIUM 20 MG
TABLET ORAL
Qty: 90 TABLET | Refills: 3 | Status: SHIPPED | OUTPATIENT
Start: 2020-01-27 | End: 2020-03-02 | Stop reason: SDUPTHER

## 2020-02-05 ENCOUNTER — OFFICE VISIT (OUTPATIENT)
Dept: FAMILY MEDICINE CLINIC | Age: 75
End: 2020-02-05
Payer: MEDICARE

## 2020-02-05 VITALS
HEART RATE: 93 BPM | WEIGHT: 211.38 LBS | SYSTOLIC BLOOD PRESSURE: 130 MMHG | BODY MASS INDEX: 33.11 KG/M2 | OXYGEN SATURATION: 96 % | DIASTOLIC BLOOD PRESSURE: 70 MMHG

## 2020-02-05 PROCEDURE — G8417 CALC BMI ABV UP PARAM F/U: HCPCS | Performed by: FAMILY MEDICINE

## 2020-02-05 PROCEDURE — 1036F TOBACCO NON-USER: CPT | Performed by: FAMILY MEDICINE

## 2020-02-05 PROCEDURE — G8399 PT W/DXA RESULTS DOCUMENT: HCPCS | Performed by: FAMILY MEDICINE

## 2020-02-05 PROCEDURE — 99213 OFFICE O/P EST LOW 20 MIN: CPT | Performed by: FAMILY MEDICINE

## 2020-02-05 PROCEDURE — 4040F PNEUMOC VAC/ADMIN/RCVD: CPT | Performed by: FAMILY MEDICINE

## 2020-02-05 PROCEDURE — G8427 DOCREV CUR MEDS BY ELIG CLIN: HCPCS | Performed by: FAMILY MEDICINE

## 2020-02-05 PROCEDURE — 1123F ACP DISCUSS/DSCN MKR DOCD: CPT | Performed by: FAMILY MEDICINE

## 2020-02-05 PROCEDURE — G8484 FLU IMMUNIZE NO ADMIN: HCPCS | Performed by: FAMILY MEDICINE

## 2020-02-05 PROCEDURE — 1090F PRES/ABSN URINE INCON ASSESS: CPT | Performed by: FAMILY MEDICINE

## 2020-02-05 PROCEDURE — 3017F COLORECTAL CA SCREEN DOC REV: CPT | Performed by: FAMILY MEDICINE

## 2020-02-05 RX ORDER — LEVOTHYROXINE SODIUM 0.05 MG/1
TABLET ORAL
Qty: 90 TABLET | Refills: 3 | Status: SHIPPED | OUTPATIENT
Start: 2020-02-05 | End: 2021-02-22

## 2020-02-05 ASSESSMENT — PATIENT HEALTH QUESTIONNAIRE - PHQ9
SUM OF ALL RESPONSES TO PHQ QUESTIONS 1-9: 0
1. LITTLE INTEREST OR PLEASURE IN DOING THINGS: 0
2. FEELING DOWN, DEPRESSED OR HOPELESS: 0
SUM OF ALL RESPONSES TO PHQ QUESTIONS 1-9: 0
SUM OF ALL RESPONSES TO PHQ9 QUESTIONS 1 & 2: 0

## 2020-02-05 NOTE — PROGRESS NOTES
respirations. No pedal edema  Assessment:   Encounter Diagnoses   Name Primary?  Encounter for screening mammogram for breast cancer Yes    Acquired hypothyroidism     Hyperlipidemia with target LDL less than 130     Breast cancer screening     Screening mammogram for high-risk patient     Encounter for screening mammogram for malignant neoplasm of breast          Plan:   Medications Discontinued During This Encounter   Medication Reason    levothyroxine (SYNTHROID) 50 MCG tablet REORDER     THE ABOVE NOTED DISCONTINUED MEDS MAY ONLY BE FROM 'CLEANING UP' THE MED LIST AND WERE NOT ACTUALLY CANCELED;  SEE CHART FOR DETAILS! Orders Placed This Encounter   Medications    levothyroxine (SYNTHROID) 50 MCG tablet     Sig: TAKE ONE TABLET BY MOUTH DAILY     Dispense:  90 tablet     Refill:  3     Orders Placed This Encounter   Procedures    RIGO DIGITAL SCREEN W CAD BILATERAL     Standing Status:   Future     Standing Expiration Date:   2/5/2021     Order Specific Question:   Reason for exam:     Answer:   breast cancer screening     Return in about 11 months (around 1/5/2021). There are no Patient Instructions on file for this visit.  sarah order rigo for patient.   [facility] won't let me

## 2020-02-20 ENCOUNTER — TELEPHONE (OUTPATIENT)
Dept: FAMILY MEDICINE CLINIC | Age: 75
End: 2020-02-20

## 2020-02-20 NOTE — TELEPHONE ENCOUNTER
PC to pt to sched AWV-pt said her and dr discussed at last visit and decided not to do it this year.

## 2020-03-02 ENCOUNTER — PATIENT MESSAGE (OUTPATIENT)
Dept: FAMILY MEDICINE CLINIC | Age: 75
End: 2020-03-02

## 2020-03-02 RX ORDER — PRAVASTATIN SODIUM 20 MG
TABLET ORAL
Qty: 90 TABLET | Refills: 0 | Status: SHIPPED | OUTPATIENT
Start: 2020-03-02 | End: 2021-02-22

## 2020-06-18 ENCOUNTER — TELEPHONE (OUTPATIENT)
Dept: NEUROLOGY | Age: 75
End: 2020-06-18

## 2020-10-22 ENCOUNTER — TELEPHONE (OUTPATIENT)
Dept: FAMILY MEDICINE CLINIC | Age: 75
End: 2020-10-22

## 2020-11-18 ENCOUNTER — OFFICE VISIT (OUTPATIENT)
Dept: FAMILY MEDICINE CLINIC | Age: 75
End: 2020-11-18
Payer: MEDICARE

## 2020-11-18 VITALS
DIASTOLIC BLOOD PRESSURE: 80 MMHG | SYSTOLIC BLOOD PRESSURE: 126 MMHG | TEMPERATURE: 96.9 F | BODY MASS INDEX: 34.2 KG/M2 | WEIGHT: 218.38 LBS | OXYGEN SATURATION: 98 % | HEART RATE: 104 BPM

## 2020-11-18 PROCEDURE — 3017F COLORECTAL CA SCREEN DOC REV: CPT | Performed by: FAMILY MEDICINE

## 2020-11-18 PROCEDURE — G8417 CALC BMI ABV UP PARAM F/U: HCPCS | Performed by: FAMILY MEDICINE

## 2020-11-18 PROCEDURE — G8484 FLU IMMUNIZE NO ADMIN: HCPCS | Performed by: FAMILY MEDICINE

## 2020-11-18 PROCEDURE — 1036F TOBACCO NON-USER: CPT | Performed by: FAMILY MEDICINE

## 2020-11-18 PROCEDURE — 1123F ACP DISCUSS/DSCN MKR DOCD: CPT | Performed by: FAMILY MEDICINE

## 2020-11-18 PROCEDURE — 1090F PRES/ABSN URINE INCON ASSESS: CPT | Performed by: FAMILY MEDICINE

## 2020-11-18 PROCEDURE — G8427 DOCREV CUR MEDS BY ELIG CLIN: HCPCS | Performed by: FAMILY MEDICINE

## 2020-11-18 PROCEDURE — 4040F PNEUMOC VAC/ADMIN/RCVD: CPT | Performed by: FAMILY MEDICINE

## 2020-11-18 PROCEDURE — 99214 OFFICE O/P EST MOD 30 MIN: CPT | Performed by: FAMILY MEDICINE

## 2020-11-18 PROCEDURE — G8399 PT W/DXA RESULTS DOCUMENT: HCPCS | Performed by: FAMILY MEDICINE

## 2020-11-18 ASSESSMENT — PATIENT HEALTH QUESTIONNAIRE - PHQ9
SUM OF ALL RESPONSES TO PHQ9 QUESTIONS 1 & 2: 0
SUM OF ALL RESPONSES TO PHQ QUESTIONS 1-9: 0
2. FEELING DOWN, DEPRESSED OR HOPELESS: 0
SUM OF ALL RESPONSES TO PHQ QUESTIONS 1-9: 0
SUM OF ALL RESPONSES TO PHQ QUESTIONS 1-9: 0
1. LITTLE INTEREST OR PLEASURE IN DOING THINGS: 0

## 2020-11-20 LAB
ABSOLUTE BASO #: 0.1 X10E9/L (ref 0–0.2)
ABSOLUTE EOS #: 0.1 X10E9/L (ref 0–0.4)
ABSOLUTE LYMPH #: 1.8 X10E9/L (ref 1–3.5)
ABSOLUTE MONO #: 0.7 X10E9/L (ref 0–0.9)
ABSOLUTE NEUT #: 4.1 X10E9/L (ref 1.5–6.6)
ALBUMIN SERPL-MCNC: 4.3 G/DL (ref 3.2–5.3)
ALK PHOSPHATASE: 58 U/L (ref 39–130)
ALT SERPL-CCNC: 12 U/L (ref 0–31)
ANION GAP SERPL CALCULATED.3IONS-SCNC: 7 MMOL/L (ref 5–15)
AST SERPL-CCNC: 15 U/L (ref 0–41)
BASOPHILS RELATIVE PERCENT: 1.2 %
BILIRUB SERPL-MCNC: 0.5 MG/DL (ref 0.3–1.2)
BUN BLDV-MCNC: 13 MG/DL (ref 5–27)
CALCIUM SERPL-MCNC: 9.3 MG/DL (ref 8.5–10.5)
CHLORIDE BLD-SCNC: 105 MMOL/L (ref 98–109)
CHOLESTEROL/HDL RATIO: 3.5 (ref 1–5)
CHOLESTEROL: 228 MG/DL (ref 150–200)
CO2: 31 MMOL/L (ref 22–32)
CREAT SERPL-MCNC: 0.82 MG/DL (ref 0.4–1)
EGFR AFRICAN AMERICAN: >60 ML/MIN/1.73SQ.M
EGFR IF NONAFRICAN AMERICAN: >60 ML/MIN/1.73SQ.M
EOSINOPHILS RELATIVE PERCENT: 1.7 %
GLUCOSE: 101 MG/DL (ref 65–99)
HCT VFR BLD CALC: 44.8 % (ref 35–47)
HDLC SERPL-MCNC: 66 MG/DL
HEMOGLOBIN: 14.8 G/DL (ref 11.7–15.5)
LDL CHOLESTEROL CALCULATED: 137 MG/DL
LDL/HDL RATIO: 2.1
LYMPHOCYTE %: 26.1 %
MCH RBC QN AUTO: 30.1 PG (ref 27–34)
MCHC RBC AUTO-ENTMCNC: 33.1 G/DL (ref 32–36)
MCV RBC AUTO: 91 FL (ref 80–100)
MONOCYTES # BLD: 10.7 %
NEUTROPHILS RELATIVE PERCENT: 60.3 %
PDW BLD-RTO: 13.5 % (ref 11.5–15)
PLATELETS: 231 X10E9/L (ref 150–450)
PMV BLD AUTO: 8.8 FL (ref 7–12)
POTASSIUM SERPL-SCNC: 4.6 MMOL/L (ref 3.5–5)
RBC: 4.92 X10E12/L (ref 3.8–5.2)
SODIUM BLD-SCNC: 143 MMOL/L (ref 134–146)
T4 FREE: 0.87 NG/DL (ref 0.61–1.6)
TOTAL PROTEIN: 7.2 G/DL (ref 6–8)
TRIGL SERPL-MCNC: 126 MG/DL (ref 27–150)
TSH SERPL DL<=0.05 MIU/L-ACNC: 4.56 UIU/ML (ref 0.49–4.67)
VLDLC SERPL CALC-MCNC: 25 MG/DL (ref 0–30)
WBC: 6.7 X10E9/L (ref 4–11)

## 2021-01-13 ENCOUNTER — PATIENT MESSAGE (OUTPATIENT)
Dept: FAMILY MEDICINE CLINIC | Age: 76
End: 2021-01-13

## 2021-01-28 ENCOUNTER — TELEPHONE (OUTPATIENT)
Dept: FAMILY MEDICINE CLINIC | Age: 76
End: 2021-01-28

## 2021-02-22 RX ORDER — PRAVASTATIN SODIUM 20 MG
TABLET ORAL
Qty: 90 TABLET | Refills: 2 | Status: SHIPPED | OUTPATIENT
Start: 2021-02-22 | End: 2021-11-19

## 2021-02-22 RX ORDER — LEVOTHYROXINE SODIUM 0.05 MG/1
TABLET ORAL
Qty: 90 TABLET | Refills: 2 | Status: SHIPPED | OUTPATIENT
Start: 2021-02-22 | End: 2021-11-19

## 2021-02-22 NOTE — TELEPHONE ENCOUNTER
Alexey Cardona is calling to request a refill on the following medication(s):    Medication Request:  Requested Prescriptions     Pending Prescriptions Disp Refills    levothyroxine (SYNTHROID) 50 MCG tablet [Pharmacy Med Name: LEVOTHYROXINE 50 MCG TABLET] 90 tablet 2     Sig: TAKE ONE TABLET BY MOUTH DAILY    pravastatin (PRAVACHOL) 20 MG tablet [Pharmacy Med Name: PRAVASTATIN SODIUM 20 MG TAB] 90 tablet 2     Sig: TAKE ONE TABLET BY MOUTH EVERY NIGHT       Last Visit Date (If Applicable):  25/89/7131    Next Visit Date:    Visit date not found

## 2021-05-12 DIAGNOSIS — Z12.31 BREAST CANCER SCREENING BY MAMMOGRAM: ICD-10-CM

## 2021-08-30 ENCOUNTER — NURSE TRIAGE (OUTPATIENT)
Dept: OTHER | Facility: CLINIC | Age: 76
End: 2021-08-30

## 2021-08-30 NOTE — TELEPHONE ENCOUNTER
Received call from BODØ at [facility] with Bee Ware. Brief description of triage:   Has had vertigo for a week      Triage indicates for patient to go to the office now, patient encouraged to go to the [facility] if no available appointments    Care advice provided, patient verbalizes understanding; denies any other questions or concerns; instructed to call back for any new or worsening symptoms. Writer provided warm transfer to Ramona at [facility] for appointment scheduling. Attention Provider: Thank you for allowing me to participate in the care of your patient. The patient was connected to triage in response to information provided to the [facility]. Please do not respond through this encounter as the response is not directed to a shared pool. Reason for Disposition   Lightheadedness (dizziness) present now, after 2 hours of rest and fluids    Answer Assessment - Initial Assessment Questions  1. DESCRIPTION: \"Describe your dizziness. \"      The room is spinning, feels like when she used to drink and would wake up with a hangover    2. LIGHTHEADED: \"Do you feel lightheaded? \" (e.g., somewhat faint, woozy, weak upon standing)      Feels lightheaded    3. VERTIGO: \"Do you feel like either you or the room is spinning or tilting? \" (i.e. vertigo)      The room is spinning    4. SEVERITY: \"How bad is it? \"  \"Do you feel like you are going to faint? \" \"Can you stand and walk? \"    - MILD - walking normally    - MODERATE - interferes with normal activities (e.g., work, school)     - SEVERE - unable to stand, requires support to walk, feels like passing out now. Mild, when she firsts gets up she has to start slowly    5. ONSET:  \"When did the dizziness begin? \"       A week ago    6. AGGRAVATING FACTORS: \"Does anything make it worse? \" (e.g., standing, change in head position)      Changing her head position    7. HEART RATE: \"Can you tell me your heart rate? \" \"How many beats in 15 seconds? \"  (Note: not all patients can do this)        No    8. CAUSE: \"What do you think is causing the dizziness? \"      Wonders if it is blood pressure    9. RECURRENT SYMPTOM: \"Have you had dizziness before? \" If so, ask: \"When was the last time? \" \"What happened that time? \"      25 years ago, got a prescription of something, cannot remember what the prescription was    10. OTHER SYMPTOMS: \"Do you have any other symptoms? \" (e.g., fever, chest pain, vomiting, diarrhea, bleeding)       nausous    11. PREGNANCY: \"Is there any chance you are pregnant? \" \"When was your last menstrual period? \"        n/a    Protocols used: [id number]

## 2021-08-31 ENCOUNTER — OFFICE VISIT (OUTPATIENT)
Dept: FAMILY MEDICINE CLINIC | Age: 76
End: 2021-08-31
Payer: MEDICARE

## 2021-08-31 VITALS
SYSTOLIC BLOOD PRESSURE: 130 MMHG | DIASTOLIC BLOOD PRESSURE: 60 MMHG | WEIGHT: 218 LBS | BODY MASS INDEX: 34.21 KG/M2 | HEIGHT: 67 IN | HEART RATE: 81 BPM | OXYGEN SATURATION: 97 %

## 2021-08-31 DIAGNOSIS — H81.13 BENIGN PAROXYSMAL POSITIONAL VERTIGO DUE TO BILATERAL VESTIBULAR DISORDER: Primary | ICD-10-CM

## 2021-08-31 PROCEDURE — 99213 OFFICE O/P EST LOW 20 MIN: CPT | Performed by: FAMILY MEDICINE

## 2021-08-31 PROCEDURE — G8417 CALC BMI ABV UP PARAM F/U: HCPCS | Performed by: FAMILY MEDICINE

## 2021-08-31 PROCEDURE — G8427 DOCREV CUR MEDS BY ELIG CLIN: HCPCS | Performed by: FAMILY MEDICINE

## 2021-08-31 PROCEDURE — 4040F PNEUMOC VAC/ADMIN/RCVD: CPT | Performed by: FAMILY MEDICINE

## 2021-08-31 PROCEDURE — 3017F COLORECTAL CA SCREEN DOC REV: CPT | Performed by: FAMILY MEDICINE

## 2021-08-31 PROCEDURE — 1123F ACP DISCUSS/DSCN MKR DOCD: CPT | Performed by: FAMILY MEDICINE

## 2021-08-31 PROCEDURE — G8399 PT W/DXA RESULTS DOCUMENT: HCPCS | Performed by: FAMILY MEDICINE

## 2021-08-31 PROCEDURE — 1090F PRES/ABSN URINE INCON ASSESS: CPT | Performed by: FAMILY MEDICINE

## 2021-08-31 PROCEDURE — 1036F TOBACCO NON-USER: CPT | Performed by: FAMILY MEDICINE

## 2021-08-31 RX ORDER — MECLIZINE HCL 12.5 MG/1
12.5 TABLET ORAL 3 TIMES DAILY PRN
Qty: 15 TABLET | Refills: 0 | Status: SHIPPED | OUTPATIENT
Start: 2021-08-31 | End: 2021-09-10

## 2021-08-31 SDOH — ECONOMIC STABILITY: FOOD INSECURITY: WITHIN THE PAST 12 MONTHS, THE FOOD YOU BOUGHT JUST DIDN'T LAST AND YOU DIDN'T HAVE MONEY TO GET MORE.: NEVER TRUE

## 2021-08-31 SDOH — ECONOMIC STABILITY: FOOD INSECURITY: WITHIN THE PAST 12 MONTHS, YOU WORRIED THAT YOUR FOOD WOULD RUN OUT BEFORE YOU GOT MONEY TO BUY MORE.: NEVER TRUE

## 2021-08-31 ASSESSMENT — PATIENT HEALTH QUESTIONNAIRE - PHQ9
SUM OF ALL RESPONSES TO PHQ QUESTIONS 1-9: 0
1. LITTLE INTEREST OR PLEASURE IN DOING THINGS: 0
SUM OF ALL RESPONSES TO PHQ QUESTIONS 1-9: 0
2. FEELING DOWN, DEPRESSED OR HOPELESS: 0
SUM OF ALL RESPONSES TO PHQ9 QUESTIONS 1 & 2: 0
SUM OF ALL RESPONSES TO PHQ QUESTIONS 1-9: 0

## 2021-08-31 ASSESSMENT — SOCIAL DETERMINANTS OF HEALTH (SDOH): HOW HARD IS IT FOR YOU TO PAY FOR THE VERY BASICS LIKE FOOD, HOUSING, MEDICAL CARE, AND HEATING?: NOT HARD AT ALL

## 2021-08-31 NOTE — PROGRESS NOTES
[facility] Progress Note    Date:8/31/2021         Patient Name:Bell Acosta     YOB: 1945     Age:76 y.o. Assessment/Plan        Problem List Items Addressed This Visit     None      Visit Diagnoses     Benign paroxysmal positional vertigo due to bilateral vestibular disorder    -  Primary    Educated and counseled  Given epley maneuvers and Meclizine prn    Relevant Medications    meclizine (ANTIVERT) 12.5 MG tablet           Return if symptoms worsen or fail to improve. Electronically signed by Jimena Olson DO on 8/31/21         Subjective     Rita Helms is a 78 y.o. female presenting today for   Chief Complaint   Patient presents with    Dizziness   . Rita Helms is a 78 y.o. female who presents for evaluation dizziness. The symptoms started 1 week ago and are gradually worsening. The attacks occur every a few hours and last several minutes. Positions that worsen symptoms: rolling in bed to the left and rolling in bed to the right. Previous workup/treatments: none. Associated ear symptoms: none. Associated CNS symptoms: none. Recent infections: none. Head trauma: denied. 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 Medications     Current Outpatient Medications   Medication Sig Dispense Refill    magnesium citrate solution Take 296 mLs by mouth once      meclizine (ANTIVERT) 12.5 MG tablet Take 1 tablet by mouth 3 times daily as needed for Dizziness 15 tablet 0    levothyroxine (SYNTHROID) 50 MCG tablet TAKE ONE TABLET BY MOUTH DAILY 90 tablet 2    pravastatin (PRAVACHOL) 20 MG tablet TAKE ONE TABLET BY MOUTH EVERY NIGHT 90 tablet 2    Glucosamine HCl 1000 MG TABS Take 1 tablet by mouth daily      Psyllium (METAMUCIL PO) Take 1 tablet by mouth daily 1 TBS daily       No current facility-administered medications for this visit.        Past History    Past Medical History:   has a past medical history of Arthritis, High cholesterol, High cholesterol, Hypothyroid, and Neuropathy. Social History:   reports that she has never smoked. She has never used smokeless tobacco. She reports current alcohol use. She reports that she does not use drugs. Family History: No family history on file. Surgical History: No past surgical history on file. Physical Examination      Vitals:  /60 (Site: Left Upper Arm, Position: Sitting, Cuff Size: Medium Adult)   Pulse 81   Ht 5' 7\" (1.702 m)   Wt 218 lb (98.9 kg)   SpO2 97%   BMI 34.14 kg/m²     Physical Exam  Vitals and nursing note reviewed. Constitutional:       General: She is not in acute distress. Appearance: Normal appearance. She is obese. She is not ill-appearing, toxic-appearing or diaphoretic. HENT:      Head: Normocephalic and atraumatic. Eyes:      General: No scleral icterus. Right eye: No discharge. Left eye: No discharge. Extraocular Movements: Extraocular movements intact. Conjunctiva/sclera: Conjunctivae normal.   Cardiovascular:      Rate and Rhythm: Normal rate and regular rhythm. Pulses: Normal pulses. Heart sounds: Normal heart sounds. No murmur heard. No friction rub. No gallop. Pulmonary:      Effort: Pulmonary effort is normal. No respiratory distress. Breath sounds: Normal breath sounds. No stridor. No wheezing, rhonchi or rales. Chest:      Chest wall: No tenderness. Neurological:      Mental Status: She is alert and oriented to person, place, and time. Mental status is at baseline. Comments: Maldonado Hallpike positive b/l   Psychiatric:         Mood and Affect: Mood normal.         Behavior: Behavior normal.         Thought Content:  Thought content normal.         Judgment: Judgment normal.         Labs/Imaging/Diagnostics   Labs:  No results found for: CMPWITHGFR, CBCAUTODIF, TSHFT4, LABA1C, LIPIDPAN    Imaging Last 24 Hours:  RIGO DIGITAL SCREEN W OR WO CAD BILATERAL   - MAMM SCREENING BILATERAL W CAD    BILATERAL DIGITAL SCREENING MAMMOGRAM 3D/2D WITH CAD: 1/29/2021    CLINICAL: Screen.      Current study was also evaluated with a Computer Aided Detection (CAD)   system.    Comparison is made to exams dated:  1/4/2018 mammogram, 8/14/2015   mammogram, 11/25/2013 mammogram, and 11/9/2011 mammogram - [address] [place] [address].    Two view 3D digital tomosynthesis as well as C-view (synthetic 2D   reconstruction with CAD) were performed. The tissue of both breasts is heterogeneously dense. This may lower   the sensitivity of mammography.      No suspicious masses, calcifications, or other findings are seen in   either breast.    There has been no significant interval change. IMPRESSION:   There is no mammographic evidence of malignancy. A 1 year screening   mammogram is recommended. Ana glasgow/melany:2/2/2021 06:54:34      letter sent: Normal/Negative    Mammogram BI-RADS: 1: Negative  Other Result Information  Interface - Rad Results/Orders In 1 - 02/02/2021  9:29 AM EST  Formatting of this note might be different from the original.     - MAMM SCREENING BILATERAL W CAD    BILATERAL DIGITAL SCREENING MAMMOGRAM 3D/2D WITH CAD: 1/29/2021    CLINICAL: Screen. Current study was also evaluated with a Computer Aided Detection (CAD)   system. Comparison is made to exams dated:  1/4/2018 mammogram, 8/14/2015   mammogram, 11/25/2013 mammogram, and 11/9/2011 mammogram - [address] [place] [address]. Two view 3D digital tomosynthesis as well as C-view (synthetic 2D   reconstruction with CAD) were performed. The tissue of both breasts is heterogeneously dense. This may lower   the sensitivity of mammography. No suspicious masses, calcifications, or other findings are seen in   either breast.    There has been no significant interval change.     IMPRESSION:   There is no mammographic evidence of malignancy. A 1 year screening   mammogram is recommended. Beti glasgow/melany:2/2/2021 68:34:82      letter sent: Normal/Negative    Mammogram BI-RADS: 1: Negative  Status    This result note and interpretation were imported from [facility] Everywhere. This import was done to improve completeness of the On license of [facility] chart. The ordering physician/provider may be different than that from the original order. Please refer to [facility] Everywhere for any additional information.    Electronically signed by Milan Mayers on 5/12/2021 at 4:17 PM

## 2021-08-31 NOTE — PATIENT INSTRUCTIONS
Patient Education        Epley Maneuver at Home for Vertigo: Exercises  Introduction  Vertigo is a spinning or whirling sensation when you move your head. Your doctor may have moved you in different positions to help your vertigo get better faster. This is called the Epley maneuver. Your doctor also may have asked you to do these exercises at home. Do the exercises as often as your doctor recommends. If your vertigo is getting worse, your doctor may have you change the exercise or stop it. Step 1  Step 1   1. Sit on the edge of a bed or sofa. Step 2   1. Turn your head 45 degrees in the direction your doctor told you to. This should be toward the ear that causes the most vertigo for you. In this picture, the woman is turning toward her left ear. Step 3   1. Tilt yourself backward until you are lying on your back. Your head should still be at a 45-degree turn. Your head should be about midway between looking straight ahead and looking out to your side. Hold for 30 seconds. If you have vertigo, stay in this position until it stops. Step 4   1. Turn your head 90 degrees toward the ear that has the least vertigo. In this picture, the woman is turning to the right because she has vertigo on her left side. The point of your chin should be raised and over your shoulder. Hold for 30 seconds. Step 5   1. Roll onto the side with the least vertigo. You should now be looking at the floor. Hold for 30 seconds. Follow-up care is a key part of your treatment and safety. Be sure to make and go to all appointments, and call your doctor if you are having problems. It's also a good idea to know your test results and keep a list of the medicines you take. Where can you learn more? Go to https://chkriseb.iodine. org and sign in to your [employer] account. Enter F154 in the Trada box to learn more about \"Epley Maneuver at Home for Vertigo: Exercises. \"     If you do not have an account, please click on the \"Sign Up Now\" link. Current as of: August 4, 2020               Content Version: 12.9  © 2006-2021 [employer]. Care instructions adapted under license by [facility] ([facility]). If you have questions about a medical condition or this instruction, always ask your healthcare professional. [address] any warranty or liability for your use of this information.

## 2021-09-21 RX ORDER — CALCIUM CARBONATE 300MG(750)
TABLET,CHEWABLE ORAL
COMMUNITY

## 2021-09-22 ENCOUNTER — ANESTHESIA EVENT (OUTPATIENT)
Dept: OPERATING ROOM | Age: 76
End: 2021-09-22
Payer: MEDICARE

## 2021-09-23 ENCOUNTER — HOSPITAL ENCOUNTER (OUTPATIENT)
Age: 76
Setting detail: OUTPATIENT SURGERY
Discharge: HOME | End: 2021-09-23
Attending: OPHTHALMOLOGY | Admitting: OPHTHALMOLOGY
Payer: MEDICARE

## 2021-09-23 ENCOUNTER — ANESTHESIA (OUTPATIENT)
Dept: OPERATING ROOM | Age: 76
End: 2021-09-23
Payer: MEDICARE

## 2021-09-23 VITALS
WEIGHT: 210 LBS | SYSTOLIC BLOOD PRESSURE: 125 MMHG | TEMPERATURE: 97 F | DIASTOLIC BLOOD PRESSURE: 70 MMHG | OXYGEN SATURATION: 96 % | HEIGHT: 67 IN | BODY MASS INDEX: 32.96 KG/M2 | RESPIRATION RATE: 14 BRPM | HEART RATE: 69 BPM

## 2021-09-23 VITALS — DIASTOLIC BLOOD PRESSURE: 70 MMHG | OXYGEN SATURATION: 100 % | SYSTOLIC BLOOD PRESSURE: 162 MMHG

## 2021-09-23 PROBLEM — H35.371 MACULAR PUCKER, RIGHT EYE: Chronic | Status: ACTIVE | Noted: 2021-09-23

## 2021-09-23 LAB
EKG ATRIAL RATE: 74 BPM
EKG P AXIS: 37 DEGREES
EKG P-R INTERVAL: 192 MS
EKG Q-T INTERVAL: 378 MS
EKG QRS DURATION: 80 MS
EKG QTC CALCULATION (BAZETT): 419 MS
EKG R AXIS: 3 DEGREES
EKG T AXIS: 22 DEGREES
EKG VENTRICULAR RATE: 74 BPM

## 2021-09-23 PROCEDURE — 93005 ELECTROCARDIOGRAM TRACING: CPT | Performed by: ANESTHESIOLOGY

## 2021-09-23 PROCEDURE — 2500000003 HC RX 250 WO HCPCS: Performed by: OPHTHALMOLOGY

## 2021-09-23 PROCEDURE — 7100000040 HC SPAR PHASE II RECOVERY - FIRST 15 MIN: Performed by: OPHTHALMOLOGY

## 2021-09-23 PROCEDURE — 2500000003 HC RX 250 WO HCPCS: Performed by: ANESTHESIOLOGY

## 2021-09-23 PROCEDURE — 93010 ELECTROCARDIOGRAM REPORT: CPT | Performed by: INTERNAL MEDICINE

## 2021-09-23 PROCEDURE — 6360000002 HC RX W HCPCS: Performed by: OPHTHALMOLOGY

## 2021-09-23 PROCEDURE — 6360000002 HC RX W HCPCS: Performed by: ANESTHESIOLOGY

## 2021-09-23 PROCEDURE — 2580000003 HC RX 258: Performed by: OPHTHALMOLOGY

## 2021-09-23 PROCEDURE — 2709999900 HC NON-CHARGEABLE SUPPLY: Performed by: OPHTHALMOLOGY

## 2021-09-23 PROCEDURE — 2580000003 HC RX 258: Performed by: ANESTHESIOLOGY

## 2021-09-23 PROCEDURE — 3700000001 HC ADD 15 MINUTES (ANESTHESIA): Performed by: OPHTHALMOLOGY

## 2021-09-23 PROCEDURE — 6370000000 HC RX 637 (ALT 250 FOR IP): Performed by: OPHTHALMOLOGY

## 2021-09-23 PROCEDURE — 3600000014 HC SURGERY LEVEL 4 ADDTL 15MIN: Performed by: OPHTHALMOLOGY

## 2021-09-23 PROCEDURE — 3600000004 HC SURGERY LEVEL 4 BASE: Performed by: OPHTHALMOLOGY

## 2021-09-23 PROCEDURE — 7100000041 HC SPAR PHASE II RECOVERY - ADDTL 15 MIN: Performed by: OPHTHALMOLOGY

## 2021-09-23 PROCEDURE — 3700000000 HC ANESTHESIA ATTENDED CARE: Performed by: OPHTHALMOLOGY

## 2021-09-23 RX ORDER — FENTANYL CITRATE 50 UG/ML
50 INJECTION, SOLUTION INTRAMUSCULAR; INTRAVENOUS EVERY 5 MIN PRN
Status: DISCONTINUED | OUTPATIENT
Start: 2021-09-23 | End: 2021-09-23 | Stop reason: HOSPADM

## 2021-09-23 RX ORDER — BALANCED SALT SOLUTION ENRICHED WITH BICARBONATE, DEXTROSE, AND GLUTATHIONE
KIT INTRAOCULAR PRN
Status: DISCONTINUED | OUTPATIENT
Start: 2021-09-23 | End: 2021-09-23 | Stop reason: ALTCHOICE

## 2021-09-23 RX ORDER — MIDAZOLAM HYDROCHLORIDE 1 MG/ML
INJECTION INTRAMUSCULAR; INTRAVENOUS PRN
Status: DISCONTINUED | OUTPATIENT
Start: 2021-09-23 | End: 2021-09-23 | Stop reason: SDUPTHER

## 2021-09-23 RX ORDER — PHENYLEPHRINE HYDROCHLORIDE 100 MG/ML
1 SOLUTION/ DROPS OPHTHALMIC EVERY 5 MIN PRN
Status: COMPLETED | OUTPATIENT
Start: 2021-09-23 | End: 2021-09-23

## 2021-09-23 RX ORDER — LIDOCAINE HYDROCHLORIDE 10 MG/ML
INJECTION, SOLUTION EPIDURAL; INFILTRATION; INTRACAUDAL; PERINEURAL PRN
Status: DISCONTINUED | OUTPATIENT
Start: 2021-09-23 | End: 2021-09-23 | Stop reason: SDUPTHER

## 2021-09-23 RX ORDER — CEFTAZIDIME 1 G/1
INJECTION, POWDER, FOR SOLUTION INTRAMUSCULAR; INTRAVENOUS PRN
Status: DISCONTINUED | OUTPATIENT
Start: 2021-09-23 | End: 2021-09-23 | Stop reason: ALTCHOICE

## 2021-09-23 RX ORDER — MEPERIDINE HYDROCHLORIDE 50 MG/ML
12.5 INJECTION INTRAMUSCULAR; INTRAVENOUS; SUBCUTANEOUS EVERY 5 MIN PRN
Status: DISCONTINUED | OUTPATIENT
Start: 2021-09-23 | End: 2021-09-23 | Stop reason: HOSPADM

## 2021-09-23 RX ORDER — TOBRAMYCIN AND DEXAMETHASONE 3; 1 MG/ML; MG/ML
1 SUSPENSION/ DROPS OPHTHALMIC
Status: COMPLETED | OUTPATIENT
Start: 2021-09-23 | End: 2021-09-23

## 2021-09-23 RX ORDER — MIDAZOLAM HYDROCHLORIDE 2 MG/2ML
1 INJECTION, SOLUTION INTRAMUSCULAR; INTRAVENOUS EVERY 10 MIN PRN
Status: DISCONTINUED | OUTPATIENT
Start: 2021-09-23 | End: 2021-09-23 | Stop reason: HOSPADM

## 2021-09-23 RX ORDER — DEXTROSE MONOHYDRATE 25 G/50ML
INJECTION, SOLUTION INTRAVENOUS PRN
Status: DISCONTINUED | OUTPATIENT
Start: 2021-09-23 | End: 2021-09-23 | Stop reason: ALTCHOICE

## 2021-09-23 RX ORDER — TROPICAMIDE 10 MG/ML
SOLUTION/ DROPS OPHTHALMIC PRN
Status: DISCONTINUED | OUTPATIENT
Start: 2021-09-23 | End: 2021-09-23 | Stop reason: ALTCHOICE

## 2021-09-23 RX ORDER — PROPOFOL 10 MG/ML
INJECTION, EMULSION INTRAVENOUS PRN
Status: DISCONTINUED | OUTPATIENT
Start: 2021-09-23 | End: 2021-09-23 | Stop reason: SDUPTHER

## 2021-09-23 RX ORDER — SODIUM CHLORIDE, SODIUM LACTATE, POTASSIUM CHLORIDE, CALCIUM CHLORIDE 600; 310; 30; 20 MG/100ML; MG/100ML; MG/100ML; MG/100ML
INJECTION, SOLUTION INTRAVENOUS CONTINUOUS
Status: DISCONTINUED | OUTPATIENT
Start: 2021-09-23 | End: 2021-09-23 | Stop reason: HOSPADM

## 2021-09-23 RX ORDER — INDOCYANINE GREEN AND WATER 25 MG
KIT INJECTION PRN
Status: DISCONTINUED | OUTPATIENT
Start: 2021-09-23 | End: 2021-09-23 | Stop reason: ALTCHOICE

## 2021-09-23 RX ORDER — ERYTHROMYCIN 5 MG/G
OINTMENT OPHTHALMIC PRN
Status: DISCONTINUED | OUTPATIENT
Start: 2021-09-23 | End: 2021-09-23 | Stop reason: ALTCHOICE

## 2021-09-23 RX ORDER — TROPICAMIDE 10 MG/ML
1 SOLUTION/ DROPS OPHTHALMIC EVERY 5 MIN PRN
Status: COMPLETED | OUTPATIENT
Start: 2021-09-23 | End: 2021-09-23

## 2021-09-23 RX ORDER — ONDANSETRON 2 MG/ML
4 INJECTION INTRAMUSCULAR; INTRAVENOUS
Status: DISCONTINUED | OUTPATIENT
Start: 2021-09-23 | End: 2021-09-23 | Stop reason: HOSPADM

## 2021-09-23 RX ORDER — FENTANYL CITRATE 50 UG/ML
25 INJECTION, SOLUTION INTRAMUSCULAR; INTRAVENOUS EVERY 5 MIN PRN
Status: DISCONTINUED | OUTPATIENT
Start: 2021-09-23 | End: 2021-09-23 | Stop reason: HOSPADM

## 2021-09-23 RX ADMIN — TROPICAMIDE 1 DROP: 10 SOLUTION/ DROPS OPHTHALMIC at 08:20

## 2021-09-23 RX ADMIN — SODIUM CHLORIDE, POTASSIUM CHLORIDE, SODIUM LACTATE AND CALCIUM CHLORIDE: 600; 310; 30; 20 INJECTION, SOLUTION INTRAVENOUS at 07:59

## 2021-09-23 RX ADMIN — TROPICAMIDE 1 DROP: 10 SOLUTION/ DROPS OPHTHALMIC at 08:10

## 2021-09-23 RX ADMIN — TROPICAMIDE 1 DROP: 10 SOLUTION/ DROPS OPHTHALMIC at 07:58

## 2021-09-23 RX ADMIN — PHENYLEPHRINE HYDROCHLORIDE 1 DROP: 100 SOLUTION/ DROPS OPHTHALMIC at 08:10

## 2021-09-23 RX ADMIN — LIDOCAINE HYDROCHLORIDE 50 MG: 10 INJECTION, SOLUTION EPIDURAL; INFILTRATION; INTRACAUDAL; PERINEURAL at 11:01

## 2021-09-23 RX ADMIN — PHENYLEPHRINE HYDROCHLORIDE 1 DROP: 100 SOLUTION/ DROPS OPHTHALMIC at 08:20

## 2021-09-23 RX ADMIN — PROPOFOL 50 MG: 10 INJECTION, EMULSION INTRAVENOUS at 11:01

## 2021-09-23 RX ADMIN — TOBRAMYCIN AND DEXAMETHASONE 1 DROP: 3; 1 SUSPENSION/ DROPS OPHTHALMIC at 07:58

## 2021-09-23 RX ADMIN — MIDAZOLAM HYDROCHLORIDE 0.5 MG: 1 INJECTION, SOLUTION INTRAMUSCULAR; INTRAVENOUS at 11:08

## 2021-09-23 RX ADMIN — PHENYLEPHRINE HYDROCHLORIDE 1 DROP: 100 SOLUTION/ DROPS OPHTHALMIC at 07:58

## 2021-09-23 ASSESSMENT — PAIN - FUNCTIONAL ASSESSMENT: PAIN_FUNCTIONAL_ASSESSMENT: 0-10

## 2021-09-23 NOTE — ANESTHESIA POSTPROCEDURE EVALUATION
Department of Anesthesiology  Postprocedure Note    Patient: Imelda Bernard  MRN: [id number]  [place]: 1945  Date of evaluation: 9/23/2021  Time:  1:53 PM     Procedure Summary     Date: 09/23/21 Room / Location: [facility] 05 / [facility]    Anesthesia Start: 1052 Anesthesia Stop: 1134    Procedure: VITRECTOMY 25 GAUGE, MEMBRANE PEEL (Right Eye) Diagnosis: (MACULAR PUCKER RIGHT EYE)    Surgeons: Ca Clinton MD Responsible Provider: Argelia Singh MD    Anesthesia Type: MAC ASA Status: 2          Anesthesia Type: MAC    Maninder Phase I:      Maninder Phase II: Maninder Score: 10    Last vitals: Reviewed and per EMR flowsheets.    POST-OP ANESTHESIA NOTE       /70   Pulse 69   Temp 97 °F (36.1 °C)   Resp 14   Ht 5' 7\" (1.702 m)   Wt 210 lb (95.3 kg)   SpO2 96%   BMI 32.89 kg/m²    Pain Assessment: 0-10  Pain Level: 0           Anesthesia Post Evaluation    Patient location during evaluation: PACU  Patient participation: complete - patient participated  Level of consciousness: awake  Pain score: 0  Airway patency: patent  Nausea & Vomiting: no vomiting and no nausea  Complications: no  Cardiovascular status: hemodynamically stable  Respiratory status: acceptable  Hydration status: stable

## 2021-09-23 NOTE — H&P
History and Physical    Pt Name: Kaylah Tamayo  MRN: [id number]  YOB: 1945  Date of evaluation: 2021  Primary Care Physician: Margarito Glover MD    SUBJECTIVE:   History of Chief Complaint:    Kaylah Tamayo is a 78 y.o. female who is scheduled today for VITRECTOMY 25 GAUGE, MEMBRANE PEEL, GAS FLUID EXCHANGE - Right. Patient reports complaint of decreased, distorted vision on the right. She reports having a macular pucker. She reports they had been watching it closely but her vision has deteriorated the past one year. Allergies  has No Known Allergies. Medications  Prior to Admission medications    Medication Sig Start Date End Date Taking? Authorizing Provider   Psyllium (METAMUCIL PO) Take by mouth Daily with lunch 2 tablespoons   Yes Historical Provider, MD   Magnesium 400 MG TABS Take by mouth Daily with lunch   Yes Historical Provider, MD   levothyroxine (SYNTHROID) 50 MCG tablet TAKE ONE TABLET BY MOUTH DAILY 21  Yes Margarito Glover MD   pravastatin (PRAVACHOL) 20 MG tablet TAKE ONE TABLET BY MOUTH EVERY NIGHT 21  Yes Margarito Glover MD   Glucosamine HCl 1000 MG TABS Take 1 tablet by mouth Daily with lunch    Yes Historical Provider, MD     Past Medical History    has a past medical history of Arthritis, BPPV (benign paroxysmal positional vertigo), High cholesterol, Hypothyroid, Macular pucker, right, Neuropathy, PONV (postoperative nausea and vomiting), Sleep apnea, and Wears glasses. Past Surgical History   has a past surgical history that includes Total hip arthroplasty (Left, ); Cataract removal with implant (Right, 2018); and Colonoscopy. Social History   reports that she has never smoked. She has never used smokeless tobacco.   reports current alcohol use. reports no history of drug use.   Marital Status   Children 3  Occupation retired jr high teacher  Family History  Family Status   Relation Name Status    Mother      Father      varicosities bilateral lower extremities. NEUROLOGIC: CN II-XII are grossly intact. Gait not assessed.    IMPRESSIONS:   Macular pucker right eye   PLANS:   VITRECTOMY 25 GAUGE, MEMBRANE PEEL, GAS FLUID EXCHANGE - Right    KINGSTON CABRERA - CNP   Electronically signed 9/23/2021 at 8:03 AM

## 2021-09-23 NOTE — ANESTHESIA PRE PROCEDURE
Department of Anesthesiology  Preprocedure Note       Name:  Young Robertson   Age:  78 y.o.  :  1945                                          MRN:  [id number]         Date:  2021      Surgeon: Bishnu Chen):  Genia Chambers MD    Procedure: Procedure(s):  VITRECTOMY 25 GAUGE, MEMBRANE PEEL, GAS FLUID EXCHANGE    Medications prior to admission:   Prior to Admission medications    Medication Sig Start Date End Date Taking?  Authorizing Provider   Psyllium (METAMUCIL PO) Take by mouth Daily with lunch 2 tablespoons   Yes Historical Provider, MD   Magnesium 400 MG TABS Take by mouth Daily with lunch   Yes Historical Provider, MD   levothyroxine (SYNTHROID) 50 MCG tablet TAKE ONE TABLET BY MOUTH DAILY 21  Yes Suhail Hyde MD   pravastatin (PRAVACHOL) 20 MG tablet TAKE ONE TABLET BY MOUTH EVERY NIGHT 21  Yes Suhail Hyde MD   Glucosamine HCl 1000 MG TABS Take 1 tablet by mouth Daily with lunch    Yes Historical Provider, MD       Current medications:    Current Facility-Administered Medications   Medication Dose Route Frequency Provider Last Rate Last Admin    tobramycin-dexamethasone (TOBRADEX) ophthalmic suspension 1 drop  1 drop Right Eye On Call to [facility], MD        phenylephrine (GAVIOTA-SYNEPHRINE) 10 % ophthalmic solution 1 drop  1 drop Right Eye Q5 Min PRN Genia Chambers MD        tropicamide (MYDRIACYL) 1 % ophthalmic solution 1 drop  1 drop Right Eye Q5 Min PRN Genia Chambers MD           Allergies:  No Known Allergies    Problem List:    Patient Active Problem List   Diagnosis Code    High cholesterol E78.00    Hyperlipidemia with target LDL less than 130 E78.5    Acquired hypothyroidism E03.9       Past Medical History:        Diagnosis Date    Arthritis     big toes    High cholesterol     Dr. Alicia Shoemaker Hypothyroid     Neuropathy     PONV (postoperative nausea and vomiting)     Sleep apnea     C-pap    Wears glasses        Past Surgical History: Procedure Laterality Date    COLONOSCOPY      multiple    EYE SURGERY Right 2018    cataract removal w/IOL    JOINT REPLACEMENT Left 2008    hip       Social History:    Social History     Tobacco Use    Smoking status: Never Smoker    Smokeless tobacco: Never Used   Substance Use Topics    Alcohol use: Yes     Comment: occasionally                                Counseling given: Not Answered      Vital Signs (Current):   Vitals:    09/21/21 1158 09/23/21 0733   BP:  (!) 145/78   Pulse:  85   Resp:  18   Temp:  98.2 °F (36.8 °C)   TempSrc:  Temporal   SpO2:  98%   Weight: 205 lb (93 kg) 210 lb (95.3 kg)   Height: 5' 7\" (1.702 m) 5' 7\" (1.702 m)                                              BP Readings from Last 3 Encounters:   09/23/21 (!) 145/78   08/31/21 130/60   11/18/20 126/80       NPO Status: Time of last liquid consumption: 2200                        Time of last solid consumption: 1800                        Date of last liquid consumption: 09/22/21                        Date of last solid food consumption: 09/22/21    BMI:   Wt Readings from Last 3 Encounters:   09/23/21 210 lb (95.3 kg)   08/31/21 218 lb (98.9 kg)   11/18/20 218 lb 6 oz (99.1 kg)     Body mass index is 32.89 kg/m².     CBC:   Lab Results   Component Value Date    WBC 6.7 11/20/2020    WBC 5.4 06/15/2012    RBC 4.92 11/20/2020    HGB 14.8 11/20/2020    HCT 44.8 11/20/2020    MCV 91 11/20/2020    RDW 13.5 11/20/2020     11/20/2020     06/15/2012       CMP:   Lab Results   Component Value Date     11/20/2020    K 4.6 11/20/2020     11/20/2020    CO2 31 11/20/2020    BUN 13 11/20/2020    CREATININE 0.82 11/20/2020    GFRAA >60 06/15/2012    LABGLOM >60 06/15/2012    GLUCOSE 101 11/20/2020    PROT 7.2 11/20/2020    CALCIUM 9.3 11/20/2020    BILITOT 0.5 11/20/2020    ALKPHOS 58 11/20/2020    ALKPHOS 61 06/15/2012    AST 15 11/20/2020    ALT 12 11/20/2020       POC Tests: No results for input(s): POCGLU, POCNA,

## 2021-12-27 ENCOUNTER — OFFICE VISIT (OUTPATIENT)
Dept: FAMILY MEDICINE CLINIC | Age: 76
End: 2021-12-27
Payer: MEDICARE

## 2021-12-27 VITALS
OXYGEN SATURATION: 95 % | DIASTOLIC BLOOD PRESSURE: 80 MMHG | BODY MASS INDEX: 34.67 KG/M2 | SYSTOLIC BLOOD PRESSURE: 120 MMHG | WEIGHT: 221.38 LBS | HEART RATE: 82 BPM

## 2021-12-27 DIAGNOSIS — G47.33 OSA ON CPAP: ICD-10-CM

## 2021-12-27 DIAGNOSIS — E03.9 ACQUIRED HYPOTHYROIDISM: Primary | ICD-10-CM

## 2021-12-27 DIAGNOSIS — E78.5 HYPERLIPIDEMIA WITH TARGET LDL LESS THAN 130: ICD-10-CM

## 2021-12-27 DIAGNOSIS — Z99.89 OSA ON CPAP: ICD-10-CM

## 2021-12-27 PROCEDURE — G8399 PT W/DXA RESULTS DOCUMENT: HCPCS | Performed by: FAMILY MEDICINE

## 2021-12-27 PROCEDURE — 3017F COLORECTAL CA SCREEN DOC REV: CPT | Performed by: FAMILY MEDICINE

## 2021-12-27 PROCEDURE — G8427 DOCREV CUR MEDS BY ELIG CLIN: HCPCS | Performed by: FAMILY MEDICINE

## 2021-12-27 PROCEDURE — G8417 CALC BMI ABV UP PARAM F/U: HCPCS | Performed by: FAMILY MEDICINE

## 2021-12-27 PROCEDURE — G8484 FLU IMMUNIZE NO ADMIN: HCPCS | Performed by: FAMILY MEDICINE

## 2021-12-27 PROCEDURE — 1036F TOBACCO NON-USER: CPT | Performed by: FAMILY MEDICINE

## 2021-12-27 PROCEDURE — 4040F PNEUMOC VAC/ADMIN/RCVD: CPT | Performed by: FAMILY MEDICINE

## 2021-12-27 PROCEDURE — 1090F PRES/ABSN URINE INCON ASSESS: CPT | Performed by: FAMILY MEDICINE

## 2021-12-27 PROCEDURE — 1123F ACP DISCUSS/DSCN MKR DOCD: CPT | Performed by: FAMILY MEDICINE

## 2021-12-27 PROCEDURE — 99214 OFFICE O/P EST MOD 30 MIN: CPT | Performed by: FAMILY MEDICINE

## 2021-12-27 ASSESSMENT — PATIENT HEALTH QUESTIONNAIRE - PHQ9
SUM OF ALL RESPONSES TO PHQ QUESTIONS 1-9: 0
SUM OF ALL RESPONSES TO PHQ QUESTIONS 1-9: 0
SUM OF ALL RESPONSES TO PHQ9 QUESTIONS 1 & 2: 0
SUM OF ALL RESPONSES TO PHQ QUESTIONS 1-9: 0
2. FEELING DOWN, DEPRESSED OR HOPELESS: 0
1. LITTLE INTEREST OR PLEASURE IN DOING THINGS: 0

## 2021-12-27 NOTE — PROGRESS NOTES
Subjective:  Sonia Beck presents for   Chief Complaint   Patient presents with    Hyperlipidemia    Hypothyroidism     Is now on cpap from her pulmonlogist. And it is going well    toelrating her meds. Is active. utd with her eye doc    She has met Dr. Gabriel Retana and would like to continue seeing him. Patient Active Problem List   Diagnosis    Hyperlipidemia with target LDL less than 130    Acquired hypothyroidism    Macular pucker, right eye         Review of Systems:  · General: no significant weight changes. · Respiratory: no cough, pleuritic chest pain, dyspnea, or wheezing  · Cardiovascular: no pain, STEPHENSON, orthopnea, palpitations or claudication  · Gastrointestinal: no chronic nausea, vomiting, heartburn, diarrhea, constipation, bloating, or abdominal pain. No bloody or black stools. Objective:  Physical Exam   Vitals: Wt Readings from Last 3 Encounters:   12/27/21 221 lb 6 oz (100.4 kg)   09/23/21 210 lb (95.3 kg)   08/31/21 218 lb (98.9 kg)     Ht Readings from Last 3 Encounters:   09/23/21 5' 7\" (1.702 m)   08/31/21 5' 7\" (1.702 m)   12/02/19 5' 7\" (1.702 m)     Body mass index is 34.67 kg/m². Vitals:    12/27/21 0935   BP: 120/80   Pulse: 82   SpO2: 95%   Weight: 221 lb 6 oz (100.4 kg)       Constitutional: She is oriented to person, place, and time. She appears well-developed and well-nourished and in no acute distress. Answers all my questions appropriately. Head: Normocephalic and atraumatic. Eyes:conjunctiva appear normal.    Nose: pink, non-edematous mucosa. No polyps. No septal deviation    Throat: no erythema, tonsillar hypertrophy or exudate. No ulcerations noted. Lips/Teeth/Gums all appear normal.    Neck: Normal range of motion. Neck supple. No tracheal deviation present. No abnormal lymphadenopathy. No JVD noted. Carotids are clear bilaterally. No thyroid masses noted. Heart: RRR without murmur. No S3, S4, or gallop noted.     Chest:   Good breath sounds noted. Clear to auscultation bilaterally. No rales, wheezes, or rhonchi noted. No respiratory retractions noted. Wall has symmetrical movement with respirations. Assessment:   Encounter Diagnoses   Name Primary?  Acquired hypothyroidism Yes    Hyperlipidemia with target LDL less than 130     MACEY on CPAP          Plan:   There are no discontinued medications. THE ABOVE NOTED DISCONTINUED MEDS MAY ONLY BE FROM 'CLEANING UP' THE MED LIST AND WERE NOT ACTUALLY CANCELED;  SEE CHART FOR DETAILS! No orders of the defined types were placed in this encounter. Orders Placed This Encounter   Procedures    TSH without Reflex     Standing Status:   Future     Standing Expiration Date:   12/27/2022    T4, Free     Standing Status:   Future     Standing Expiration Date:   12/27/2022    CBC Auto Differential     Standing Status:   Future     Standing Expiration Date:   12/27/2022    Comprehensive Metabolic Panel     Standing Status:   Future     Standing Expiration Date:   12/27/2022    Lipid Panel     Standing Status:   Future     Standing Expiration Date:   12/27/2022     Order Specific Question:   Is Patient Fasting?/# of Hours     Answer:   0     Return in about 6 months (around 6/27/2022). There are no Patient Instructions on file for this visit.   Dr. Edi Caputo

## 2021-12-28 LAB
ABSOLUTE BASO #: 0.1 X10E9/L (ref 0–0.2)
ABSOLUTE EOS #: 0.1 X10E9/L (ref 0–0.4)
ABSOLUTE LYMPH #: 1.5 X10E9/L (ref 1–3.5)
ABSOLUTE MONO #: 0.6 X10E9/L (ref 0–0.9)
ABSOLUTE NEUT #: 4 X10E9/L (ref 1.5–6.6)
ALBUMIN SERPL-MCNC: 4.2 G/DL (ref 3.2–5.3)
ALK PHOSPHATASE: 63 U/L (ref 39–130)
ALT SERPL-CCNC: 15 U/L (ref 0–31)
ANION GAP SERPL CALCULATED.3IONS-SCNC: 7 MMOL/L (ref 5–15)
AST SERPL-CCNC: 15 U/L (ref 0–41)
BASOPHILS RELATIVE PERCENT: 0.9 %
BILIRUB SERPL-MCNC: 0.5 MG/DL (ref 0.3–1.2)
BUN BLDV-MCNC: 15 MG/DL (ref 5–27)
CALCIUM SERPL-MCNC: 9.1 MG/DL (ref 8.5–10.5)
CHLORIDE BLD-SCNC: 104 MMOL/L (ref 98–109)
CHOLESTEROL/HDL RATIO: 3.3 (ref 1–5)
CHOLESTEROL: 204 MG/DL (ref 150–200)
CO2: 30 MMOL/L (ref 22–32)
CREAT SERPL-MCNC: 0.71 MG/DL (ref 0.4–1)
EGFR AFRICAN AMERICAN: >60 ML/MIN/1.73SQ.M
EGFR IF NONAFRICAN AMERICAN: >60 ML/MIN/1.73SQ.M
EOSINOPHILS RELATIVE PERCENT: 1.8 %
GLUCOSE: 96 MG/DL (ref 65–99)
HCT VFR BLD CALC: 39.8 % (ref 35–47)
HDLC SERPL-MCNC: 61 MG/DL
HEMOGLOBIN: 13.9 G/DL (ref 11.7–15.5)
LDL CHOLESTEROL CALCULATED: 122 MG/DL
LDL/HDL RATIO: 2
LYMPHOCYTE %: 24.3 %
MCH RBC QN AUTO: 31 PG (ref 27–34)
MCHC RBC AUTO-ENTMCNC: 34.9 G/DL (ref 32–36)
MCV RBC AUTO: 89 FL (ref 80–100)
MONOCYTES # BLD: 9.7 %
NEUTROPHILS RELATIVE PERCENT: 63.3 %
PDW BLD-RTO: 13.6 % (ref 11.5–15)
PLATELETS: 217 X10E9/L (ref 150–450)
PMV BLD AUTO: 8.5 FL (ref 7–12)
POTASSIUM SERPL-SCNC: 3.9 MMOL/L (ref 3.5–5)
RBC: 4.48 X10E12/L (ref 3.8–5.2)
SODIUM BLD-SCNC: 141 MMOL/L (ref 134–146)
T4 FREE: 0.74 NG/DL (ref 0.61–1.6)
TOTAL PROTEIN: 6.9 G/DL (ref 6–8)
TRIGL SERPL-MCNC: 107 MG/DL (ref 27–150)
TSH SERPL DL<=0.05 MIU/L-ACNC: 4.18 UIU/ML (ref 0.49–4.67)
VLDLC SERPL CALC-MCNC: 21 MG/DL (ref 0–30)
WBC: 6.4 X10E9/L (ref 4–11)

## 2022-04-20 DIAGNOSIS — E03.9 ACQUIRED HYPOTHYROIDISM: Primary | ICD-10-CM

## 2022-04-20 DIAGNOSIS — E78.5 HYPERLIPIDEMIA WITH TARGET LDL LESS THAN 130: ICD-10-CM

## 2022-04-20 NOTE — TELEPHONE ENCOUNTER
Miriam Garces is calling to request a refill on the following medication(s):    Medication Request:  Requested Prescriptions     Pending Prescriptions Disp Refills    levothyroxine (SYNTHROID) 50 MCG tablet 30 tablet 5     Sig: Take 1 tablet by mouth Daily       Last Visit Date (If Applicable):  5/51/8067    Next Visit Date:    6/27/2022

## 2022-04-21 RX ORDER — PRAVASTATIN SODIUM 20 MG
20 TABLET ORAL DAILY
Qty: 30 TABLET | Refills: 5 | Status: SHIPPED | OUTPATIENT
Start: 2022-04-21 | End: 2022-05-20 | Stop reason: SDUPTHER

## 2022-04-21 RX ORDER — LEVOTHYROXINE SODIUM 0.05 MG/1
50 TABLET ORAL DAILY
Qty: 30 TABLET | Refills: 5 | Status: SHIPPED | OUTPATIENT
Start: 2022-04-21 | End: 2022-05-20 | Stop reason: SDUPTHER

## 2022-05-19 DIAGNOSIS — E03.9 ACQUIRED HYPOTHYROIDISM: ICD-10-CM

## 2022-05-19 DIAGNOSIS — E78.5 HYPERLIPIDEMIA WITH TARGET LDL LESS THAN 130: ICD-10-CM

## 2022-05-19 NOTE — TELEPHONE ENCOUNTER
Imelda Bernard is calling to request a refill on the following medication(s):    Medication Request:  Requested Prescriptions     Pending Prescriptions Disp Refills    levothyroxine (SYNTHROID) 50 MCG tablet 30 tablet 5     Sig: Take 1 tablet by mouth Daily    pravastatin (PRAVACHOL) 20 MG tablet 30 tablet 5     Sig: Take 1 tablet by mouth daily TAKE ONE TABLET BY MOUTH ONCE NIGHTLY *MUST MAKE APPOINTMENT       Last Visit Date (If Applicable):  4/09/2480    Next Visit Date:    6/27/2022

## 2022-05-20 RX ORDER — LEVOTHYROXINE SODIUM 0.05 MG/1
50 TABLET ORAL DAILY
Qty: 30 TABLET | Refills: 5 | Status: SHIPPED | OUTPATIENT
Start: 2022-05-20 | End: 2022-07-01 | Stop reason: SDUPTHER

## 2022-05-20 RX ORDER — PRAVASTATIN SODIUM 20 MG
20 TABLET ORAL DAILY
Qty: 30 TABLET | Refills: 5 | Status: SHIPPED | OUTPATIENT
Start: 2022-05-20 | End: 2022-06-30 | Stop reason: SDUPTHER

## 2022-06-30 DIAGNOSIS — E78.5 HYPERLIPIDEMIA WITH TARGET LDL LESS THAN 130: ICD-10-CM

## 2022-06-30 DIAGNOSIS — E03.9 ACQUIRED HYPOTHYROIDISM: ICD-10-CM

## 2022-06-30 NOTE — TELEPHONE ENCOUNTER
Fanta Baer is calling to request a refill on the following medication(s):    Last Visit Date (If Applicable):  1/70/9189    Next Visit Date:    7/21/2022    Medication Request:  Requested Prescriptions      No prescriptions requested or ordered in this encounter

## 2022-07-01 RX ORDER — LEVOTHYROXINE SODIUM 0.05 MG/1
50 TABLET ORAL DAILY
Qty: 30 TABLET | Refills: 5 | Status: SHIPPED | OUTPATIENT
Start: 2022-07-01

## 2022-07-01 RX ORDER — PRAVASTATIN SODIUM 20 MG
20 TABLET ORAL DAILY
Qty: 30 TABLET | Refills: 2 | Status: SHIPPED | OUTPATIENT
Start: 2022-07-01

## 2022-07-21 ENCOUNTER — OFFICE VISIT (OUTPATIENT)
Dept: FAMILY MEDICINE CLINIC | Age: 77
End: 2022-07-21
Payer: MEDICARE

## 2022-07-21 VITALS
OXYGEN SATURATION: 99 % | TEMPERATURE: 96.9 F | WEIGHT: 220.6 LBS | HEART RATE: 90 BPM | SYSTOLIC BLOOD PRESSURE: 130 MMHG | BODY MASS INDEX: 34.55 KG/M2 | DIASTOLIC BLOOD PRESSURE: 80 MMHG

## 2022-07-21 DIAGNOSIS — M13.0 POLYARTHRITIS: Primary | ICD-10-CM

## 2022-07-21 DIAGNOSIS — G47.33 OSA ON CPAP: Chronic | ICD-10-CM

## 2022-07-21 DIAGNOSIS — E78.5 HYPERLIPIDEMIA WITH TARGET LDL LESS THAN 130: Chronic | ICD-10-CM

## 2022-07-21 DIAGNOSIS — G89.29 CHRONIC LEFT SHOULDER PAIN: ICD-10-CM

## 2022-07-21 DIAGNOSIS — M25.512 CHRONIC LEFT SHOULDER PAIN: ICD-10-CM

## 2022-07-21 DIAGNOSIS — E03.9 ACQUIRED HYPOTHYROIDISM: Chronic | ICD-10-CM

## 2022-07-21 DIAGNOSIS — H81.12 BENIGN PAROXYSMAL POSITIONAL VERTIGO OF LEFT EAR: ICD-10-CM

## 2022-07-21 DIAGNOSIS — Z99.89 OSA ON CPAP: Chronic | ICD-10-CM

## 2022-07-21 PROCEDURE — 1036F TOBACCO NON-USER: CPT | Performed by: FAMILY MEDICINE

## 2022-07-21 PROCEDURE — 1123F ACP DISCUSS/DSCN MKR DOCD: CPT | Performed by: FAMILY MEDICINE

## 2022-07-21 PROCEDURE — G8427 DOCREV CUR MEDS BY ELIG CLIN: HCPCS | Performed by: FAMILY MEDICINE

## 2022-07-21 PROCEDURE — G8399 PT W/DXA RESULTS DOCUMENT: HCPCS | Performed by: FAMILY MEDICINE

## 2022-07-21 PROCEDURE — 1090F PRES/ABSN URINE INCON ASSESS: CPT | Performed by: FAMILY MEDICINE

## 2022-07-21 PROCEDURE — G8417 CALC BMI ABV UP PARAM F/U: HCPCS | Performed by: FAMILY MEDICINE

## 2022-07-21 PROCEDURE — 99214 OFFICE O/P EST MOD 30 MIN: CPT | Performed by: FAMILY MEDICINE

## 2022-07-21 PROCEDURE — 3017F COLORECTAL CA SCREEN DOC REV: CPT | Performed by: FAMILY MEDICINE

## 2022-07-21 ASSESSMENT — PATIENT HEALTH QUESTIONNAIRE - PHQ9
SUM OF ALL RESPONSES TO PHQ9 QUESTIONS 1 & 2: 0
2. FEELING DOWN, DEPRESSED OR HOPELESS: 0
1. LITTLE INTEREST OR PLEASURE IN DOING THINGS: 0
SUM OF ALL RESPONSES TO PHQ QUESTIONS 1-9: 0

## 2022-07-21 ASSESSMENT — ENCOUNTER SYMPTOMS
ALLERGIC/IMMUNOLOGIC NEGATIVE: 1
GASTROINTESTINAL NEGATIVE: 1
SHORTNESS OF BREATH: 0
EYES NEGATIVE: 1
RESPIRATORY NEGATIVE: 1

## 2022-07-21 NOTE — PROGRESS NOTES
[facility] Progress Note    Date:7/21/2022         Patient Name:Bell Bolivar     YOB: 1945     Age:77 y.o. Assessment/Plan        Problem List Items Addressed This Visit          Respiratory    MACEY on CPAP (Chronic)     Patient is very compliant with CPAP therapy  Patient benefits greatly from CPAP therapy  Recommend continuing CPAP therapy         Relevant Orders    CBC with Auto Differential       Endocrine    Acquired hypothyroidism (Chronic)      Unclear control, continue current plan pending work up below and medication adherence emphasized         Relevant Orders    TSH with Reflex       Nervous and Auditory    Benign paroxysmal positional vertigo of left ear       Musculoskeletal and Integument    Polyarthritis - Primary      Borderline controlled, continue current medications and continue current treatment plan         Relevant Orders    CBC with Auto Differential       Other    Hyperlipidemia with target LDL less than 130 (Chronic)      Unclear control, continue current plan pending work up below, medication adherence emphasized and lifestyle modifications recommended         Relevant Orders    Comprehensive Metabolic Panel    Lipid Panel    Chronic left shoulder pain      PT         Relevant Orders    External Referral To Physical Therapy        Return in about 6 months (around 1/21/2023). Electronically signed by Mono Golden DO on 7/21/22       Total time spent was between Time personally spent assessing and managing the patient on the date of service: Est: 30-39 minutes ([id number]) mins. This included time spent reviewing the patient's medical record (e.g., recent visits, labs, and studies); seeing the patient in the office (face-to-face time); ordering medications, studies, procedures, or referrals; calling the patient or family later in the day with results and further recommendations; and documenting the visit in the medical record.      Teja     Juarezraven Mejia is a 78 y.o. female presenting today for   Chief Complaint   Patient presents with    New Patient    Arm Pain     Left arm pain for 2 months     Dizziness     Only when she lays down    . Daina Aase is a 78 y.o. female who presents for evaluation dizziness. The symptoms started several months ago and are stable. The attacks occur every several days and last several minutes. Positions that worsen symptoms: rolling in bed to the left. Epley maneuvers help    Hypothyroidism  Patient complains of hypothyroidism. Current symptoms: none. Patient denies change in energy level, diarrhea, heat / cold intolerance, nervousness, palpitations, and weight changes. Onset of symptoms was several years ago. Symptoms have been well-controlled. Sleep Apnea:  Current treatment: cPAP. Compliance: compliant most of the time. Residual symptoms include: none. Shoulder Pain   The pain is present in the left shoulder. This is a chronic problem. The current episode started more than 1 month ago. There has been no history of extremity trauma. The problem occurs intermittently. The problem has been waxing and waning. The quality of the pain is described as aching. The pain is moderate. Associated symptoms include a limited range of motion. Pertinent negatives include no fever, inability to bear weight, itching, joint locking, joint swelling, numbness or stiffness. She has tried nothing for the symptoms. The treatment provided no relief. There is no history of diabetes. Hyperlipidemia  This is a chronic problem. The current episode started more than 1 year ago. The problem is controlled. Recent lipid tests were reviewed and are normal. Exacerbating diseases include hypothyroidism and obesity. She has no history of chronic renal disease, diabetes, liver disease or nephrotic syndrome. Pertinent negatives include no chest pain, focal sensory loss, focal weakness, leg pain, myalgias or shortness of breath.  Current antihyperlipidemic treatment includes statins. The current treatment provides significant improvement of lipids. Review of Systems   Review of Systems   Constitutional: Negative. Negative for fever. HENT: Negative. Eyes: Negative. Respiratory: Negative. Negative for shortness of breath. Cardiovascular: Negative. Negative for chest pain. Gastrointestinal: Negative. Endocrine: Negative. Genitourinary: Negative. Musculoskeletal: Negative. Negative for myalgias and stiffness. Skin: Negative. Negative for itching. Allergic/Immunologic: Negative. Neurological: Negative. Negative for focal weakness and numbness. Hematological: Negative. Psychiatric/Behavioral: Negative. All other systems reviewed and are negative. Medications     Current Outpatient Medications   Medication Sig Dispense Refill    levothyroxine (SYNTHROID) 50 MCG tablet Take 1 tablet by mouth Daily 30 tablet 5    pravastatin (PRAVACHOL) 20 MG tablet Take 1 tablet by mouth daily TAKE ONE TABLET BY MOUTH ONCE NIGHTLY *MUST MAKE APPOINTMENT 30 tablet 2    Psyllium (METAMUCIL PO) Take by mouth Daily with lunch 2 tablespoons      Magnesium 400 MG TABS Take by mouth Daily with lunch      Glucosamine HCl 1000 MG TABS Take 1 tablet by mouth Daily with lunch        No current facility-administered medications for this visit. Past History    Past Medical History:   has a past medical history of Arthritis, BPPV (benign paroxysmal positional vertigo), High cholesterol, Hypothyroid, Macular pucker, right, Neuropathy, PONV (postoperative nausea and vomiting), Sleep apnea, and Wears glasses. Social History:   reports that she has never smoked. She has never used smokeless tobacco. She reports current alcohol use. She reports that she does not use drugs.      Family History:   Family History   Problem Relation Age of Onset    Heart Failure Mother     Emphysema Mother     Heart Disease Father     Arrhythmia Brother Arrhythmia Brother     Diabetes Paternal Grandfather        Surgical History:   Past Surgical History:   Procedure Laterality Date    CATARACT REMOVAL WITH IMPLANT Right 2018    COLONOSCOPY      multiple    TOTAL HIP ARTHROPLASTY Left 2008    VITRECTOMY Right 09/23/2021    VITRECTOMY 25 GAUGE, MEMBRANE PEEL     VITRECTOMY Right 9/23/2021    VITRECTOMY 25 GAUGE, MEMBRANE PEEL performed by Lennox Byars, MD at [address]:  /80 (Site: Right Upper Arm, Position: Sitting, Cuff Size: Small Adult)   Pulse 90   Temp 96.9 °F (36.1 °C) (Temporal)   Wt 220 lb 9.6 oz (100.1 kg)   SpO2 99%   BMI 34.55 kg/m²     Physical Exam  Vitals and nursing note reviewed. Constitutional:       General: She is not in acute distress. Appearance: Normal appearance. She is obese. She is not ill-appearing, toxic-appearing or diaphoretic. HENT:      Head: Normocephalic and atraumatic. Eyes:      General: No scleral icterus. Right eye: No discharge. Left eye: No discharge. Extraocular Movements: Extraocular movements intact. Conjunctiva/sclera: Conjunctivae normal.   Cardiovascular:      Rate and Rhythm: Normal rate and regular rhythm. Pulses: Normal pulses. Heart sounds: Normal heart sounds. No murmur heard. No friction rub. No gallop. Pulmonary:      Effort: Pulmonary effort is normal. No respiratory distress. Breath sounds: Normal breath sounds. No stridor. No wheezing, rhonchi or rales. Chest:      Chest wall: No tenderness. Musculoskeletal:      Left shoulder: Tenderness present. No swelling, deformity, effusion, laceration, bony tenderness or crepitus. Decreased range of motion. Decreased strength. Normal pulse. Neurological:      Mental Status: She is alert and oriented to person, place, and time. Mental status is at baseline.    Psychiatric:         Mood and Affect: Mood normal.         Behavior: Behavior normal.         Thought Content: Thought content normal.         Judgment: Judgment normal.       Labs/Imaging/Diagnostics   Labs:  No results found for: CMPWITHGFR, CBCAUTODIF, TSHFT4, LABA1C, LIPIDPAN    Imaging Last 24 Hours:  RIGO DIGITAL SCREEN W OR WO CAD BILATERAL   - MAMM SCREENING BILATERAL W CAD    BILATERAL DIGITAL SCREENING MAMMOGRAM 3D/2D WITH CAD: 1/29/2021    CLINICAL: Screen. Current study was also evaluated with a Computer Aided Detection (CAD)   system. Comparison is made to exams dated:  1/4/2018 mammogram, 8/14/2015   mammogram, 11/25/2013 mammogram, and 11/9/2011 mammogram - [address] [place] [address]. Two view 3D digital tomosynthesis as well as C-view (synthetic 2D   reconstruction with CAD) were performed. The tissue of both breasts is heterogeneously dense. This may lower   the sensitivity of mammography. No suspicious masses, calcifications, or other findings are seen in   either breast.    There has been no significant interval change. IMPRESSION:   There is no mammographic evidence of malignancy. A 1 year screening   mammogram is recommended. Efren glasgow/melany:2/2/2021 49:94:[id number]      letter sent: Normal/Negative    Mammogram BI-RADS: 1: Negative  Other Result Information  Interface - Rad Results/Orders In 1 - 02/02/2021  9:29 AM EST  Formatting of this note might be different from the original.     - MAMM SCREENING BILATERAL W CAD    BILATERAL DIGITAL SCREENING MAMMOGRAM 3D/2D WITH CAD: 1/29/2021    CLINICAL: Screen. Current study was also evaluated with a Computer Aided Detection (CAD)   system. Comparison is made to exams dated:  1/4/2018 mammogram, 8/14/2015   mammogram, 11/25/2013 mammogram, and 11/9/2011 mammogram - [address] [place] [address]. Two view 3D digital tomosynthesis as well as C-view (synthetic 2D   reconstruction with CAD) were performed. The tissue of both breasts is heterogeneously dense.  This may lower   the sensitivity of mammography. No suspicious masses, calcifications, or other findings are seen in   either breast.    There has been no significant interval change. IMPRESSION:   There is no mammographic evidence of malignancy. A 1 year screening   mammogram is recommended. Dimitris glasgow/melany:2/2/2021 62:[id number]:86      letter sent: Normal/Negative    Mammogram BI-RADS: 1: Negative  Status    This result note and interpretation were imported from Care Everywhere. This import was done to improve completeness of the [facility] chart. The ordering physician/provider may be different than that from the original order. Please refer to [facility] Everywhere for any additional information.    Electronically signed by Brianna Osborne on 5/12/2021 at 4:17 PM

## 2022-07-27 ENCOUNTER — TELEPHONE (OUTPATIENT)
Dept: FAMILY MEDICINE CLINIC | Age: 77
End: 2022-07-27

## 2022-07-27 DIAGNOSIS — M70.912: Primary | ICD-10-CM

## 2022-07-27 NOTE — TELEPHONE ENCOUNTER
Adelfo called in requesting a new referral be faxed with a new diagnosis because original diagnosis is no longer used.  Need physical therapy order attached to diagnosis M70.912 - Unspecified soft tissue disorder related to use, overuse and pressure, left shoulder    Please fax it to [phone]

## 2022-08-01 ENCOUNTER — TELEPHONE (OUTPATIENT)
Dept: FAMILY MEDICINE CLINIC | Age: 77
End: 2022-08-01

## 2022-08-03 LAB
ALBUMIN SERPL-MCNC: 4.6 G/DL
ALP BLD-CCNC: 67 U/L
ALT SERPL-CCNC: 10 U/L
ANION GAP SERPL CALCULATED.3IONS-SCNC: 12 MMOL/L
AST SERPL-CCNC: 13 U/L
BASOPHILS ABSOLUTE: 0.07 /ΜL
BASOPHILS RELATIVE PERCENT: 1.1 %
BILIRUB SERPL-MCNC: 0.4 MG/DL (ref 0.1–1.4)
BUN BLDV-MCNC: 15 MG/DL
CALCIUM SERPL-MCNC: 8.8 MG/DL
CHLORIDE BLD-SCNC: 107 MMOL/L
CHOLESTEROL, TOTAL: 203 MG/DL
CHOLESTEROL/HDL RATIO: 3.1
CO2: 24 MMOL/L
CREAT SERPL-MCNC: 0.75 MG/DL
EOSINOPHILS ABSOLUTE: 0.12 /ΜL
EOSINOPHILS RELATIVE PERCENT: 2 %
GFR CALCULATED: 83
GLUCOSE BLD-MCNC: 99 MG/DL
HCT VFR BLD CALC: 41.9 % (ref 36–46)
HDLC SERPL-MCNC: 65 MG/DL (ref 35–70)
HEMOGLOBIN: 13.9 G/DL (ref 12–16)
LDL CHOLESTEROL CALCULATED: 123 MG/DL (ref 0–160)
LYMPHOCYTES ABSOLUTE: 1.5 /ΜL
LYMPHOCYTES RELATIVE PERCENT: 24.6 %
MCH RBC QN AUTO: 29.5 PG
MCHC RBC AUTO-ENTMCNC: 33.6 G/DL
MCV RBC AUTO: 87.9 FL
MONOCYTES ABSOLUTE: 0.61 /ΜL
MONOCYTES RELATIVE PERCENT: 10 %
NEUTROPHILS ABSOLUTE: 3.79 /ΜL
NEUTROPHILS RELATIVE PERCENT: 62.1 %
NONHDLC SERPL-MCNC: NORMAL MG/DL
PDW BLD-RTO: 13.4 %
PLATELET # BLD: 208 K/ΜL
PMV BLD AUTO: 10.5 FL
POTASSIUM SERPL-SCNC: 4.2 MMOL/L
RBC # BLD: 4.71 10^6/ΜL
SODIUM BLD-SCNC: 143 MMOL/L
TOTAL PROTEIN: 6.7
TRIGL SERPL-MCNC: 77 MG/DL
TSH SERPL DL<=0.05 MIU/L-ACNC: 4.06 UIU/ML
VLDLC SERPL CALC-MCNC: 15 MG/DL
WBC # BLD: 6.1 10^3/ML

## 2022-08-10 DIAGNOSIS — E78.5 HYPERLIPIDEMIA WITH TARGET LDL LESS THAN 130: Chronic | ICD-10-CM

## 2022-08-10 DIAGNOSIS — G47.33 OSA ON CPAP: Chronic | ICD-10-CM

## 2022-08-10 DIAGNOSIS — Z99.89 OSA ON CPAP: Chronic | ICD-10-CM

## 2022-08-10 DIAGNOSIS — M13.0 POLYARTHRITIS: ICD-10-CM

## 2022-08-10 DIAGNOSIS — E03.9 ACQUIRED HYPOTHYROIDISM: Chronic | ICD-10-CM

## 2023-01-27 ENCOUNTER — OFFICE VISIT (OUTPATIENT)
Dept: FAMILY MEDICINE CLINIC | Age: 78
End: 2023-01-27

## 2023-01-27 VITALS
BODY MASS INDEX: 34.14 KG/M2 | SYSTOLIC BLOOD PRESSURE: 120 MMHG | HEART RATE: 76 BPM | DIASTOLIC BLOOD PRESSURE: 70 MMHG | WEIGHT: 218 LBS | OXYGEN SATURATION: 96 %

## 2023-01-27 DIAGNOSIS — G47.33 OSA ON CPAP: Primary | Chronic | ICD-10-CM

## 2023-01-27 DIAGNOSIS — Z99.89 OSA ON CPAP: Primary | Chronic | ICD-10-CM

## 2023-01-27 DIAGNOSIS — E78.5 HYPERLIPIDEMIA WITH TARGET LDL LESS THAN 130: ICD-10-CM

## 2023-01-27 DIAGNOSIS — G89.29 CHRONIC LEFT SHOULDER PAIN: ICD-10-CM

## 2023-01-27 DIAGNOSIS — E03.9 ACQUIRED HYPOTHYROIDISM: Chronic | ICD-10-CM

## 2023-01-27 DIAGNOSIS — H81.12 BENIGN PAROXYSMAL POSITIONAL VERTIGO OF LEFT EAR: ICD-10-CM

## 2023-01-27 DIAGNOSIS — R73.01 IFG (IMPAIRED FASTING GLUCOSE): ICD-10-CM

## 2023-01-27 DIAGNOSIS — M25.512 CHRONIC LEFT SHOULDER PAIN: ICD-10-CM

## 2023-01-27 SDOH — ECONOMIC STABILITY: FOOD INSECURITY: WITHIN THE PAST 12 MONTHS, YOU WORRIED THAT YOUR FOOD WOULD RUN OUT BEFORE YOU GOT MONEY TO BUY MORE.: NEVER TRUE

## 2023-01-27 SDOH — ECONOMIC STABILITY: FOOD INSECURITY: WITHIN THE PAST 12 MONTHS, THE FOOD YOU BOUGHT JUST DIDN'T LAST AND YOU DIDN'T HAVE MONEY TO GET MORE.: NEVER TRUE

## 2023-01-27 ASSESSMENT — PATIENT HEALTH QUESTIONNAIRE - PHQ9
SUM OF ALL RESPONSES TO PHQ9 QUESTIONS 1 & 2: 0
SUM OF ALL RESPONSES TO PHQ QUESTIONS 1-9: 0
1. LITTLE INTEREST OR PLEASURE IN DOING THINGS: 0
SUM OF ALL RESPONSES TO PHQ QUESTIONS 1-9: 0
SUM OF ALL RESPONSES TO PHQ QUESTIONS 1-9: 0
2. FEELING DOWN, DEPRESSED OR HOPELESS: 0
SUM OF ALL RESPONSES TO PHQ QUESTIONS 1-9: 0

## 2023-01-27 ASSESSMENT — ENCOUNTER SYMPTOMS
RESPIRATORY NEGATIVE: 1
GASTROINTESTINAL NEGATIVE: 1
EYES NEGATIVE: 1
SHORTNESS OF BREATH: 0
ALLERGIC/IMMUNOLOGIC NEGATIVE: 1

## 2023-01-27 ASSESSMENT — SOCIAL DETERMINANTS OF HEALTH (SDOH): HOW HARD IS IT FOR YOU TO PAY FOR THE VERY BASICS LIKE FOOD, HOUSING, MEDICAL CARE, AND HEATING?: NOT HARD AT ALL

## 2023-01-27 NOTE — PATIENT INSTRUCTIONS
Patient Education        Learning About Low-Fat Eating  What is low-fat eating? Most food has some fat in it. Your body needs some fat to be healthy. But some kinds of fats are healthier than others. In a low-fat eating plan, you try to choose healthier fats and eat fewer unhealthy fats. Healthy fats include olive and canola oil. Try to avoid eating too much saturated fat, such as in cheese and meats. You do not need to cut all fat from your diet. But you can make healthier choices about the types and amount of fat you eat. Even though it is a good idea to choose healthier fats, it is still important to be careful of how much fat you eat, because all fats are high in calories. What are the different types of fats? Unhealthy fat  Saturated fat. Saturated fats are mostly in animal foods, such as meat and dairy foods. Tropical oils, such as coconut oil, palm oil, and cocoa butter, are also saturated fats. Healthy fats  Monounsaturated fat. Monounsaturated fats are liquid at room temperature but get solid when refrigerated. Eating foods that are high in this fat may help lower your \"bad\" (LDL) cholesterol, keep your \"good\" (HDL) cholesterol level up, and lower your chances of getting coronary artery disease. This fat is found in canola oil, olive oil, peanut oil, olives, avocados, nuts, and nut butters. Polyunsaturated fat. Polyunsaturated fats are liquid at room temperature. They are in safflower, sunflower, and corn oils. They are also the main fat in seafood. Omega-3 fatty acids are types of polyunsaturated fat. Eating fish may lower your chances of getting coronary artery disease. Fatty fish such as salmon and mackerel contain these healthy fatty acids. So do ground flaxseeds and flaxseed oil, soybeans, walnuts, and seeds. Why cut down on unhealthy fats? Eating foods that contain saturated fats can raise the LDL (\"bad\") cholesterol in your blood.  Having a high level of LDL cholesterol increases your chance of hardening of the arteries (atherosclerosis), which can lead to heart disease, heart attack, and stroke. In general:  No more than 10% of your daily calories should come from saturated fat. This is about 20 grams in a 2,000-calorie diet. No more than 10% of your daily calories should come from polyunsaturated fat. This is about 20 grams in a 2,000-calorie diet. Monounsaturated fats can be up to 15% of your daily calories. This is about 25 to 30 grams in a 2,000-calorie diet. If you're not sure how much fat you should be eating or how many calories you need each day to stay at a healthy weight, talk to a registered dietitian. A dietitian can help you create a plan that's right for you. What can you do to cut down on fat? Foods like cheese, butter, sausage, and desserts can have a lot of unhealthy fats. Try these tips for healthier meals at home and when you eat out. At home  Fill up on fruits, vegetables, and whole grains. Think of meat as a side dish instead of as the main part of your meal.  When you do eat meat, make it extra-lean ground beef (97% lean), ground turkey breast (without skin added), meats with fat trimmed off before cooking, or skinless chicken. Try main dishes that use whole wheat pasta, brown rice, dried beans, or vegetables. Use cooking methods that use little or no fat, such as broiling, steaming, or grilling. Use cooking spray instead of oil. If you use oil, use a monounsaturated oil, such as canola or olive oil. Read food labels on canned, bottled, or packaged foods. Choose those with little saturated fat. When eating out at a restaurant  Order foods that are broiled or poached instead of fried or breaded. Cut back on the amount of butter or margarine that you use on bread. Use small amounts of olive oil instead. Order sauces, gravies, and salad dressings on the side, and use only a little. When you order pasta, choose tomato sauce instead of cream sauce.   Ask for [employer] with your baked potato instead of sour cream, butter, cheese, or lopez. Where can you learn more? Go to http://www.bertrand.com/ and enter W495 to learn more about \"Learning About Low-Fat Eating. \"  Current as of: October 6, 2021               Content Version: 13.5  © 2006-2022 [employer]. Care instructions adapted under license by [facility] ([facility]). If you have questions about a medical condition or this instruction, always ask your healthcare professional. [address] any warranty or liability for your use of this information.

## 2023-01-27 NOTE — PROGRESS NOTES
APSO Progress Note    Date:1/27/2023         Patient Name:Bell Baldwin     YOB: 1945     Age:77 y.o. Assessment/Plan        Problem List Items Addressed This Visit          Respiratory    MACEY on CPAP - Primary (Chronic)     Patient is very compliant with CPAP therapy  Patient benefits greatly from CPAP therapy  Recommend continuing CPAP therapy         Relevant Orders    CBC with Auto Differential       Endocrine    Acquired hypothyroidism (Chronic)      Unclear control, continue current plan pending work up below and medication adherence emphasized         Relevant Orders    CBC with Auto Differential    TSH with Reflex       Nervous and Auditory    Benign paroxysmal positional vertigo of left ear      At goal, continue current medications and continue current treatment plan         Relevant Orders    CBC with Auto Differential       Other    Chronic left shoulder pain      Uncontrolled, changes made today: xray/mri today - PT did not help fully         Relevant Orders    CBC with Auto Differential    MRI SHOULDER LEFT WO CONTRAST    XR SHOULDER LEFT (MIN 2 VIEWS)    Hyperlipidemia with target LDL less than 130 (Chronic)      Unclear control, continue current plan pending work up below, medication adherence emphasized and lifestyle modifications recommended         Relevant Orders    CBC with Auto Differential    Comprehensive Metabolic Panel    Lipid Panel     Other Visit Diagnoses       IFG (impaired fasting glucose)        Relevant Orders    Hemoglobin A1C             Return in about 6 months (around 7/27/2023) for checkup and AWV. Electronically signed by Shanell Barfield DO on 1/27/23       Total time spent was between Time personally spent assessing and managing the patient on the date of service: Est: 30-39 minutes (60934) mins.  This included time spent reviewing the patient's medical record (e.g., recent visits, labs, and studies); seeing the patient in the office (face-to-face time); ordering medications, studies, procedures, or referrals; calling the patient or family later in the day with results and further recommendations; and documenting the visit in the medical record. Lita Anderson is a 78 y.o. female presenting today for   Chief Complaint   Patient presents with    Follow-up   . Claudio Oquendo is a 78 y.o. female who presents for evaluation dizziness. The symptoms started several months ago and are stable. The attacks occur every several days and last several minutes. Positions that worsen symptoms: rolling in bed to the left. Epley maneuvers help    Hypothyroidism  Patient complains of hypothyroidism. Current symptoms: none. Patient denies change in energy level, diarrhea, heat / cold intolerance, nervousness, palpitations, and weight changes. Onset of symptoms was several years ago. Symptoms have been well-controlled. Sleep Apnea:  Current treatment: cPAP. Compliance: compliant most of the time. Residual symptoms include: none. Shoulder Pain   The pain is present in the left shoulder. This is a chronic problem. The current episode started more than 1 month ago. There has been no history of extremity trauma. The problem occurs intermittently. The problem has been waxing and waning. The quality of the pain is described as aching. The pain is moderate. Associated symptoms include a limited range of motion. Pertinent negatives include no fever, inability to bear weight, itching, joint locking, joint swelling, numbness or stiffness. She has tried nothing for the symptoms. The treatment provided no relief. There is no history of diabetes. Hyperlipidemia  This is a chronic problem. The current episode started more than 1 year ago. The problem is controlled. Recent lipid tests were reviewed and are normal. Exacerbating diseases include hypothyroidism and obesity.  She has no history of chronic renal disease, diabetes, liver disease or nephrotic syndrome. Pertinent negatives include no chest pain, focal sensory loss, focal weakness, leg pain, myalgias or shortness of breath. Current antihyperlipidemic treatment includes statins. The current treatment provides significant improvement of lipids. Review of Systems   Review of Systems   Constitutional: Negative. Negative for fever. HENT: Negative. Eyes: Negative. Respiratory: Negative. Negative for shortness of breath. Cardiovascular: Negative. Negative for chest pain. Gastrointestinal: Negative. Endocrine: Negative. Genitourinary: Negative. Musculoskeletal: Negative. Negative for myalgias and stiffness. Skin: Negative. Negative for itching. Allergic/Immunologic: Negative. Neurological: Negative. Negative for focal weakness and numbness. Hematological: Negative. Psychiatric/Behavioral: Negative. All other systems reviewed and are negative. Medications     Current Outpatient Medications   Medication Sig Dispense Refill    levothyroxine (SYNTHROID) 50 MCG tablet Take 1 tablet by mouth Daily 30 tablet 5    pravastatin (PRAVACHOL) 20 MG tablet Take 1 tablet by mouth daily TAKE ONE TABLET BY MOUTH ONCE NIGHTLY *MUST MAKE APPOINTMENT 30 tablet 2    Psyllium (METAMUCIL PO) Take by mouth Daily with lunch 2 tablespoons      Magnesium 400 MG TABS Take by mouth Daily with lunch      Glucosamine HCl 1000 MG TABS Take 1 tablet by mouth Daily with lunch        No current facility-administered medications for this visit. Past History    Past Medical History:   has a past medical history of Arthritis, BPPV (benign paroxysmal positional vertigo), High cholesterol, Hypothyroid, Macular pucker, right, Neuropathy, PONV (postoperative nausea and vomiting), Sleep apnea, and Wears glasses. Social History:   reports that she has never smoked. She has never used smokeless tobacco. She reports current alcohol use. She reports that she does not use drugs. Family History:   Family History   Problem Relation Age of Onset    Heart Failure Mother     Emphysema Mother     Heart Disease Father     Arrhythmia Brother     Arrhythmia Brother     Diabetes Paternal Grandfather        Surgical History:   Past Surgical History:   Procedure Laterality Date    CATARACT REMOVAL WITH IMPLANT Right 2018    COLONOSCOPY      multiple    TOTAL HIP ARTHROPLASTY Left 2008    VITRECTOMY Right 09/23/2021    VITRECTOMY 25 GAUGE, MEMBRANE PEEL     VITRECTOMY Right 9/23/2021    VITRECTOMY 25 GAUGE, MEMBRANE PEEL performed by Aria Mccabe MD at [place] [address]        Physical Examination      Vitals:  /70   Pulse 76   Wt 218 lb (98.9 kg)   SpO2 96%   BMI 34.14 kg/m²     Physical Exam  Vitals and nursing note reviewed. Constitutional:       General: She is not in acute distress. Appearance: Normal appearance. She is obese. She is not ill-appearing, toxic-appearing or diaphoretic. HENT:      Head: Normocephalic and atraumatic. Eyes:      General: No scleral icterus. Right eye: No discharge. Left eye: No discharge. Extraocular Movements: Extraocular movements intact. Conjunctiva/sclera: Conjunctivae normal.   Cardiovascular:      Rate and Rhythm: Normal rate and regular rhythm. Pulses: Normal pulses. Heart sounds: Normal heart sounds. No murmur heard. No friction rub. No gallop. Pulmonary:      Effort: Pulmonary effort is normal. No respiratory distress. Breath sounds: Normal breath sounds. No stridor. No wheezing, rhonchi or rales. Chest:      Chest wall: No tenderness. Musculoskeletal:      Left shoulder: Tenderness present. No swelling, deformity, effusion, laceration, bony tenderness or crepitus. Decreased range of motion. Decreased strength. Normal pulse. Neurological:      Mental Status: She is alert and oriented to person, place, and time. Mental status is at baseline.    Psychiatric:         Mood and Affect: Mood normal. Behavior: Behavior normal.         Thought Content: Thought content normal.         Judgment: Judgment normal.       Labs/Imaging/Diagnostics   Labs:  No results found for: CMPWITHGFR, CBCAUTODIF, TSHFT4, LABA1C, LIPIDPAN    Imaging Last 24 Hours:  RIGO DIGITAL SCREEN W OR WO CAD BILATERAL   - MAMM SCREENING BILATERAL W CAD    BILATERAL DIGITAL SCREENING MAMMOGRAM 3D/2D WITH CAD: 1/29/2021    CLINICAL: Screen. Current study was also evaluated with a Computer Aided Detection (CAD)   system. Comparison is made to exams dated:  1/4/2018 mammogram, 8/14/2015   mammogram, 11/25/2013 mammogram, and 11/9/2011 mammogram - [address] [place] [address]. Two view 3D digital tomosynthesis as well as C-view (synthetic 2D   reconstruction with CAD) were performed. The tissue of both breasts is heterogeneously dense. This may lower   the sensitivity of mammography. No suspicious masses, calcifications, or other findings are seen in   either breast.    There has been no significant interval change. IMPRESSION:   There is no mammographic evidence of malignancy. A 1 year screening   mammogram is recommended. Rosa Maria glasgow/mariluzrad:2/2/2021 61:09:10      letter sent: Normal/Negative    Mammogram BI-RADS: 1: Negative  Other Result Information  Interface - Rad Results/Orders In 1 - 02/02/2021  9:29 AM EST  Formatting of this note might be different from the original.     - MAMM SCREENING BILATERAL W CAD    BILATERAL DIGITAL SCREENING MAMMOGRAM 3D/2D WITH CAD: 1/29/2021    CLINICAL: Screen. Current study was also evaluated with a Computer Aided Detection (CAD)   system. Comparison is made to exams dated:  1/4/2018 mammogram, 8/14/2015   mammogram, 11/25/2013 mammogram, and 11/9/2011 mammogram - [address] [place] [address]. Two view 3D digital tomosynthesis as well as C-view (synthetic 2D   reconstruction with CAD) were performed.      The tissue of both breasts is heterogeneously dense. This may lower   the sensitivity of mammography. No suspicious masses, calcifications, or other findings are seen in   either breast.    There has been no significant interval change. IMPRESSION:   There is no mammographic evidence of malignancy. A 1 year screening   mammogram is recommended. Olivia glasgow/melany:2/2/2021 56:75:12      letter sent: Normal/Negative    Mammogram BI-RADS: 1: Negative  Status    This result note and interpretation were imported from [facility]. This import was done to improve completeness of the [facility] chart. The ordering physician/provider may be different than that from the original order. Please refer to [facility] for any additional information.    Electronically signed by Cena Boas on 5/12/2021 at 4:17 PM

## 2023-01-27 NOTE — ASSESSMENT & PLAN NOTE
Patient is very compliant with CPAP therapy  Patient benefits greatly from CPAP therapy  Recommend continuing CPAP therapy

## 2023-01-31 ENCOUNTER — HOSPITAL ENCOUNTER (OUTPATIENT)
Dept: GENERAL RADIOLOGY | Facility: CLINIC | Age: 78
Discharge: HOME | End: 2023-02-02
Payer: MEDICARE

## 2023-01-31 ENCOUNTER — HOSPITAL ENCOUNTER (OUTPATIENT)
Facility: CLINIC | Age: 78
Discharge: HOME | End: 2023-02-02
Payer: MEDICARE

## 2023-01-31 DIAGNOSIS — G89.29 CHRONIC LEFT SHOULDER PAIN: ICD-10-CM

## 2023-01-31 DIAGNOSIS — M25.512 CHRONIC LEFT SHOULDER PAIN: ICD-10-CM

## 2023-01-31 PROCEDURE — 73030 X-RAY EXAM OF SHOULDER: CPT

## 2023-02-01 DIAGNOSIS — M85.80 OSTEOPENIA, UNSPECIFIED LOCATION: Primary | ICD-10-CM

## 2023-02-01 RX ORDER — OYSTER SHELL CALCIUM WITH VITAMIN D 500; 200 MG/1; [IU]/1
1 TABLET, FILM COATED ORAL DAILY
Qty: 90 TABLET | Refills: 1 | Status: SHIPPED | OUTPATIENT
Start: 2023-02-01

## 2023-02-22 DIAGNOSIS — E78.5 HYPERLIPIDEMIA WITH TARGET LDL LESS THAN 130: ICD-10-CM

## 2023-02-23 RX ORDER — PRAVASTATIN SODIUM 20 MG
20 TABLET ORAL DAILY
Qty: 90 TABLET | Refills: 1 | Status: SHIPPED | OUTPATIENT
Start: 2023-02-23

## 2023-02-23 NOTE — TELEPHONE ENCOUNTER
Dori Dominguez is calling to request a refill on the following medication(s):    Medication Request:  Requested Prescriptions     Pending Prescriptions Disp Refills    pravastatin (PRAVACHOL) 20 MG tablet [Pharmacy Med Name: PRAVASTATIN SODIUM 20 MG TAB] 90 tablet 1     Sig: Take 1 tablet by mouth daily       Last Visit Date (If Applicable):  6/09/9850    Next Visit Date:    7/27/2023

## 2023-05-23 DIAGNOSIS — E03.9 ACQUIRED HYPOTHYROIDISM: ICD-10-CM

## 2023-05-23 RX ORDER — LEVOTHYROXINE SODIUM 0.05 MG/1
TABLET ORAL
Qty: 90 TABLET | Refills: 1 | Status: SHIPPED | OUTPATIENT
Start: 2023-05-23

## 2023-05-23 NOTE — PROGRESS NOTES
bilaterally. No thyroid masses noted. Heart: RRR without murmur. No S3, S4, or gallop noted. Chest: Clear to auscultation bilaterally. Good breath sounds noted. No rales, wheezes, or rhonchi noted. No respiratory retractions noted. Wall has symmetrical movement with respirations. Sensory is normal to 10gm monfilament  Normal strnght  Normal vascualr exma  Assessment:   No diagnosis found.   Hand numbness      Plan:   Sounds psotiiotnal.    use the pillows to giveher more weight sitributions
No

## 2023-05-23 NOTE — TELEPHONE ENCOUNTER
Kimberly Kate is calling to request a refill on the following medication(s):    Medication Request:  Requested Prescriptions     Pending Prescriptions Disp Refills    levothyroxine (SYNTHROID) 50 MCG tablet [Pharmacy Med Name: LEVOTHYROXINE 50 MCG TABLET] 90 tablet 1     Sig: TAKE ONE TABLET BY MOUTH DAILY       Last Visit Date (If Applicable):  6/60/9295    Next Visit Date:    7/27/2023

## 2023-05-24 NOTE — PROGRESS NOTES
Fax received from [employer] for [employer] supplies. Fax CMN form along with last 2 chart notes to [phone]. Will place in Endo bin.   Subjective:  Tiesha Parry presents for   Chief Complaint   Patient presents with    Hyperlipidemia    Hypothyroidism     Feels fine. toleratint the meds    Patient Active Problem List   Diagnosis    High cholesterol    Hyperlipidemia with target LDL less than 130    Acquired hypothyroidism         Review of Systems:  · General: no significant weight changes. · Respiratory: no cough, pleuritic chest pain, dyspnea, or wheezing  · Cardiovascular: no pain, STEPHENSON, orthopnea, palpitations or claudication  · Gastrointestinal: no chronic nausea, vomiting, heartburn, diarrhea, constipation, bloating, or abdominal pain. No bloody or black stools. Objective:  Physical Exam   Vitals:   Vitals:    11/18/20 0901   BP: 126/80   Pulse: 104   Temp: 96.9 °F (36.1 °C)   TempSrc: Temporal   SpO2: 98%   Weight: 218 lb 6 oz (99.1 kg)     Wt Readings from Last 3 Encounters:   11/18/20 218 lb 6 oz (99.1 kg)   02/05/20 211 lb 6 oz (95.9 kg)   12/02/19 210 lb (95.3 kg)     Ht Readings from Last 3 Encounters:   12/02/19 5' 7\" (1.702 m)   09/26/19 5' 7\" (1.702 m)   09/18/18 5' 6\" (1.676 m)     Body mass index is 34.2 kg/m². Constitutional: She is oriented to person, place, and time. She appears well-developed and well-nourished and in no acute distress. Answers all my questions appropriately. Head: Normocephalic and atraumatic. Eyes:conjunctiva appear normal.  Nose: pink, non-edematous mucosa. No polyps. No septal deviation  Throat: no erythema, tonsillar hypertrophy or exudate. No ulcerations noted. Lips/Teeth/Gums all appear normal.  Neck: Normal range of motion. Neck supple. No tracheal deviation present. No abnormal lymphadenopathy. No JVD noted. Carotids are clear bilaterally. No thyroid masses noted. Heart: RRR without murmur. No S3, S4, or gallop noted. Chest: Clear to auscultation bilaterally. Good breath sounds noted. No rales, wheezes, or rhonchi noted. No respiratory retractions noted.   [address] has symmetrical movement with respirations. No pedal edema  Assessment:   Encounter Diagnoses   Name Primary?  Acquired hypothyroidism Yes    Hyperlipidemia with target LDL less than 130     Breast cancer screening by mammogram          Plan:   There are no discontinued medications. THE ABOVE NOTED DISCONTINUED MEDS MAY ONLY BE FROM 'CLEANING UP' THE MED LIST AND WERE NOT ACTUALLY CANCELED;  SEE CHART FOR DETAILS! No orders of the defined types were placed in this encounter. Orders Placed This Encounter   Procedures    RIGO DIGITAL SCREEN W OR WO CAD BILATERAL     Standing Status:   Future     Standing Expiration Date:   11/18/2021     Order Specific Question:   Reason for exam:     Answer:   See ICDM-10 code attached    TSH without Reflex     Standing Status:   Future     Standing Expiration Date:   11/18/2021    T4, Free     Standing Status:   Future     Standing Expiration Date:   11/18/2021    CBC Auto Differential     Standing Status:   Future     Standing Expiration Date:   11/18/2021    Comprehensive Metabolic Panel     Standing Status:   Future     Standing Expiration Date:   11/18/2021    Lipid Panel     Standing Status:   Future     Standing Expiration Date:   11/18/2021     Order Specific Question:   Is Patient Fasting?/# of Hours     Answer:   8     Return in about 11 months (around 10/18/2021). There are no Patient Instructions on file for this visit.   Data Unavailable      Discussed shingrix

## 2023-07-24 SDOH — ECONOMIC STABILITY: INCOME INSECURITY: HOW HARD IS IT FOR YOU TO PAY FOR THE VERY BASICS LIKE FOOD, HOUSING, MEDICAL CARE, AND HEATING?: NOT VERY HARD

## 2023-07-24 SDOH — ECONOMIC STABILITY: FOOD INSECURITY: WITHIN THE PAST 12 MONTHS, YOU WORRIED THAT YOUR FOOD WOULD RUN OUT BEFORE YOU GOT MONEY TO BUY MORE.: NEVER TRUE

## 2023-07-24 SDOH — ECONOMIC STABILITY: HOUSING INSECURITY
IN THE LAST 12 MONTHS, WAS THERE A TIME WHEN YOU DID NOT HAVE A STEADY PLACE TO SLEEP OR SLEPT IN A SHELTER (INCLUDING NOW)?: NO

## 2023-07-24 SDOH — ECONOMIC STABILITY: TRANSPORTATION INSECURITY
IN THE PAST 12 MONTHS, HAS LACK OF TRANSPORTATION KEPT YOU FROM MEETINGS, WORK, OR FROM GETTING THINGS NEEDED FOR DAILY LIVING?: NO

## 2023-07-24 SDOH — ECONOMIC STABILITY: FOOD INSECURITY: WITHIN THE PAST 12 MONTHS, THE FOOD YOU BOUGHT JUST DIDN'T LAST AND YOU DIDN'T HAVE MONEY TO GET MORE.: NEVER TRUE

## 2023-07-24 SDOH — HEALTH STABILITY: PHYSICAL HEALTH: ON AVERAGE, HOW MANY DAYS PER WEEK DO YOU ENGAGE IN MODERATE TO STRENUOUS EXERCISE (LIKE A BRISK WALK)?: 5 DAYS

## 2023-07-24 ASSESSMENT — LIFESTYLE VARIABLES
HOW OFTEN DO YOU HAVE SIX OR MORE DRINKS ON ONE OCCASION: 1
HOW OFTEN DO YOU HAVE A DRINK CONTAINING ALCOHOL: MONTHLY OR LESS
HOW MANY STANDARD DRINKS CONTAINING ALCOHOL DO YOU HAVE ON A TYPICAL DAY: 1
HOW OFTEN DO YOU HAVE A DRINK CONTAINING ALCOHOL: 2
HOW MANY STANDARD DRINKS CONTAINING ALCOHOL DO YOU HAVE ON A TYPICAL DAY: 1 OR 2

## 2023-07-24 ASSESSMENT — PATIENT HEALTH QUESTIONNAIRE - PHQ9
1. LITTLE INTEREST OR PLEASURE IN DOING THINGS: 0
2. FEELING DOWN, DEPRESSED OR HOPELESS: 0
SUM OF ALL RESPONSES TO PHQ QUESTIONS 1-9: 0
SUM OF ALL RESPONSES TO PHQ9 QUESTIONS 1 & 2: 0
SUM OF ALL RESPONSES TO PHQ QUESTIONS 1-9: 0

## 2023-07-27 ENCOUNTER — OFFICE VISIT (OUTPATIENT)
Dept: FAMILY MEDICINE CLINIC | Age: 78
End: 2023-07-27

## 2023-07-27 VITALS
BODY MASS INDEX: 33.36 KG/M2 | WEIGHT: 213 LBS | OXYGEN SATURATION: 96 % | HEART RATE: 111 BPM | SYSTOLIC BLOOD PRESSURE: 120 MMHG | DIASTOLIC BLOOD PRESSURE: 70 MMHG

## 2023-07-27 DIAGNOSIS — R73.01 IFG (IMPAIRED FASTING GLUCOSE): ICD-10-CM

## 2023-07-27 DIAGNOSIS — M17.0 BILATERAL PRIMARY OSTEOARTHRITIS OF KNEE: ICD-10-CM

## 2023-07-27 DIAGNOSIS — Z00.00 MEDICARE ANNUAL WELLNESS VISIT, SUBSEQUENT: Primary | ICD-10-CM

## 2023-07-27 DIAGNOSIS — E03.9 ACQUIRED HYPOTHYROIDISM: Chronic | ICD-10-CM

## 2023-07-27 DIAGNOSIS — M25.512 CHRONIC LEFT SHOULDER PAIN: ICD-10-CM

## 2023-07-27 DIAGNOSIS — E78.5 HYPERLIPIDEMIA WITH TARGET LDL LESS THAN 130: Chronic | ICD-10-CM

## 2023-07-27 DIAGNOSIS — Z71.89 ACP (ADVANCE CARE PLANNING): ICD-10-CM

## 2023-07-27 DIAGNOSIS — Z99.89 OSA ON CPAP: Chronic | ICD-10-CM

## 2023-07-27 DIAGNOSIS — G47.33 OSA ON CPAP: Chronic | ICD-10-CM

## 2023-07-27 DIAGNOSIS — G89.29 CHRONIC LEFT SHOULDER PAIN: ICD-10-CM

## 2023-07-27 PROBLEM — H35.371 MACULAR PUCKER, RIGHT EYE: Chronic | Status: RESOLVED | Noted: 2021-09-23 | Resolved: 2023-07-27

## 2023-07-27 ASSESSMENT — ENCOUNTER SYMPTOMS
ALLERGIC/IMMUNOLOGIC NEGATIVE: 1
EYES NEGATIVE: 1
SHORTNESS OF BREATH: 0
GASTROINTESTINAL NEGATIVE: 1
RESPIRATORY NEGATIVE: 1

## 2023-07-27 NOTE — PATIENT INSTRUCTIONS
these benefits include a comprehensive review of your medical history including lifestyle, illnesses that may run in your family, and various assessments and screenings as appropriate. After reviewing your medical record and screening and assessments performed today your provider may have ordered immunizations, labs, imaging, and/or referrals for you. A list of these orders (if applicable) as well as your Preventive Care list are included within your After Visit Summary for your review. Other Preventive Recommendations:    A preventive eye exam performed by an eye specialist is recommended every 1-2 years to screen for glaucoma; cataracts, macular degeneration, and other eye disorders. A preventive dental visit is recommended every 6 months. Try to get at least 150 minutes of exercise per week or 10,000 steps per day on a pedometer . Order or download the FREE \"Exercise & Physical Activity: Your Everyday Guide\" from [employer]. Call 7-443.185.8942 or search [employer] online. You need 7171-7408 mg of calcium and 9930-0054 IU of vitamin D per day. It is possible to meet your calcium requirement with diet alone, but a vitamin D supplement is usually necessary to meet this goal.  When exposed to the sun, use a sunscreen that protects against both UVA and UVB radiation with an SPF of 30 or greater. Reapply every 2 to 3 hours or after sweating, drying off with a towel, or swimming. Always wear a seat belt when traveling in a car. Always wear a helmet when riding a bicycle or motorcycle.

## 2023-07-28 ENCOUNTER — CLINICAL DOCUMENTATION (OUTPATIENT)
Dept: SPIRITUAL SERVICES | Age: 78
End: 2023-07-28

## 2023-07-28 NOTE — ACP (ADVANCE CARE PLANNING)
Advance Care Planning   Ambulatory ACP Specialist Patient Outreach    Date:  7/28/2023    ACP Specialist:  Bianca Painting    Outreach call to patient in follow-up to ACP Specialist referral from:Nishant White DO    [x] PCP  [] Provider   [] Ambulatory Care Management [] Other     For:                  [x] Advance Directive Assistance              [] Complete Portable DNR order              [] Complete POST/POLST/MOST              [] Code Status Discussion             [] Discuss Goals of Care             [] Early ACP Decision-Making              [] Other (Specify)    Date Referral Received:7/27/23    Next Step:   [] ACP scheduled conversation  [x] Outreach again in one week               [] Email / Mail [address]  [] Email / Mail Advance Directive   [] Closing referral.  Routing closure to referring provider/staff and to ACP Specialist . [] Closure letter mailed to patient with invitation to contact ACP Specialist if / when ready. [] Other (Specify here):         [x] At this time, Healthcare Decision [place] Decision Maker:    Primary Decision Maker: Raphael Mary - Child - [phone]    Click here to complete Healthcare Decision Makers including selection of the Healthcare Decision Maker Relationship (ie \"Primary\"). [] Primary agent named in scanned advance directive. [x] Legal Next of Kin. [] Unable to determine legal decision maker at this time. Outreaches:       [x] 1st -  Date:  7/28/23               Intervention:  [] Spoke with Patient   [x] Left Voice mail [] Email / Mail    [] [employer]  [] Other [id number]) : Outcomes: Left detailed VM for Patient to return call. Will make another outreach in 1 week. [] 2nd -  Date:                 Intervention:  [] Spoke with Patient  [] Left Voice mail [] Email / Mail    [] [employer]  [] Other [id number] :               Outcomes:                [] 3rd -  Date:

## 2023-08-07 NOTE — TELEPHONE ENCOUNTER
Valentina Ruiz is calling to request a refill on the following medication(s):    Medication Request:  Requested Prescriptions     Pending Prescriptions Disp Refills    Calcium Carb-Cholecalciferol (OYSTER SHELL CALCIUM W/D) 500-5 MG-MCG TABS tablet [Pharmacy Med Name: OYSTER SHELL CALC-VIT D 500MG-200U] 90 tablet      Sig: TAKE ONE TABLET BY MOUTH DAILY       Last Visit Date (If Applicable):  5/82/6186    Next Visit Date:    1/29/2024

## 2023-08-18 DIAGNOSIS — E78.5 HYPERLIPIDEMIA WITH TARGET LDL LESS THAN 130: ICD-10-CM

## 2023-08-18 DIAGNOSIS — E03.9 ACQUIRED HYPOTHYROIDISM: ICD-10-CM

## 2023-08-18 LAB
ABSOLUTE BASO #: 0.06 K/UL (ref 0–0.2)
ABSOLUTE EOS #: 0.16 K/UL (ref 0–0.5)
ABSOLUTE LYMPH #: 1.57 K/UL (ref 1–4)
ABSOLUTE MONO #: 0.53 K/UL (ref 0.2–1)
ABSOLUTE NEUT #: 3.66 K/UL (ref 1.5–7.5)
ALBUMIN SERPL-MCNC: 4.7 G/DL (ref 3.5–5.2)
ALK PHOSPHATASE: 62 U/L (ref 40–142)
ALT SERPL-CCNC: 15 U/L (ref 5–40)
ANION GAP SERPL CALCULATED.3IONS-SCNC: 12 MEQ/L (ref 7–16)
AST SERPL-CCNC: 18 U/L (ref 9–40)
AVERAGE GLUCOSE: 108 MG/DL
BASOPHILS RELATIVE PERCENT: 1 %
BILIRUB SERPL-MCNC: 0.4 MG/DL
BUN BLDV-MCNC: 14 MG/DL (ref 8–23)
CALCIUM SERPL-MCNC: 9.4 MG/DL (ref 8.5–10.5)
CHLORIDE BLD-SCNC: 104 MEQ/L (ref 95–107)
CHOLESTEROL/HDL RATIO: 3.4 RATIO
CHOLESTEROL: 217 MG/DL
CO2: 26 MEQ/L (ref 19–31)
CREAT SERPL-MCNC: 0.71 MG/DL (ref 0.6–1.3)
EGFR IF NONAFRICAN AMERICAN: 88 ML/MIN/1.73
EOSINOPHILS RELATIVE PERCENT: 2.7 %
GLUCOSE: 87 MG/DL (ref 70–99)
HBA1C MFR BLD: 5.4 % (ref 4.2–5.6)
HCT VFR BLD CALC: 43.2 % (ref 34–45)
HDLC SERPL-MCNC: 63 MG/DL
HEMOGLOBIN: 14.4 G/DL (ref 11.5–15.5)
LDL CHOLESTEROL CALCULATED: 132 MG/DL
LDL/HDL RATIO: 2.1 RATIO
LYMPHOCYTE %: 26.2 %
MCH RBC QN AUTO: 29.9 PG (ref 25–33)
MCHC RBC AUTO-ENTMCNC: 33.3 G/DL (ref 31–36)
MCV RBC AUTO: 89.6 FL (ref 80–99)
MONOCYTES # BLD: 8.8 %
NEUTROPHILS RELATIVE PERCENT: 61 %
PDW BLD-RTO: 13.6 % (ref 11.5–15)
PLATELETS: 208 K/UL (ref 130–400)
PMV BLD AUTO: 10.8 FL (ref 9.3–13)
POTASSIUM SERPL-SCNC: 4.3 MEQ/L (ref 3.5–5.4)
RBC: 4.82 M/UL (ref 3.8–5.4)
SODIUM BLD-SCNC: 142 MEQ/L (ref 133–146)
T4 FREE: 1.03 NG/DL (ref 0.8–1.9)
TOTAL PROTEIN: 6.8 G/DL (ref 6.1–8.3)
TRIGL SERPL-MCNC: 112 MG/DL
TSH SERPL DL<=0.05 MIU/L-ACNC: 5.57 UIU/ML (ref 0.4–4.1)
VLDLC SERPL CALC-MCNC: 22 MG/DL
WBC: 6 K/UL (ref 3.5–11)

## 2023-08-18 RX ORDER — LEVOTHYROXINE SODIUM 0.07 MG/1
75 TABLET ORAL DAILY
Qty: 90 TABLET | Refills: 1 | Status: SHIPPED | OUTPATIENT
Start: 2023-08-18

## 2023-08-18 RX ORDER — PRAVASTATIN SODIUM 40 MG
40 TABLET ORAL DAILY
Qty: 90 TABLET | Refills: 1 | Status: SHIPPED | OUTPATIENT
Start: 2023-08-18

## 2023-09-14 ENCOUNTER — CLINICAL DOCUMENTATION (OUTPATIENT)
Dept: SPIRITUAL SERVICES | Age: 78
End: 2023-09-14

## 2023-09-14 NOTE — ACP (ADVANCE CARE PLANNING)
Advance Care Planning   Ambulatory ACP Specialist Patient Outreach    Date:  9/14/2023    ACP Specialist:  HARDEEP Miguel    Outreach call to patient in follow-up to ACP Specialist referral from:Nishant Hargrove DO    [x] PCP  [] Provider   [] Ambulatory Care Management [] Other     For:                  [x] Advance Directive Assistance              [] Complete Portable DNR order              [] Complete POST/POLST/MOST              [] Code Status Discussion             [] Discuss Goals of Care             [] Early ACP Decision-Making              [] Other (Specify)    Date Referral Received:07/27/2023    Next Step:   [x] ACP scheduled conversation  [] Outreach again in one week               [] Email / Mail [address]  [] Email / Mail Advance Directive   [] Closing referral.  Routing closure to referring provider/staff and to ACP Specialist . [] Closure letter mailed to patient with invitation to contact ACP Specialist if / when ready. [] Other (Specify here):         [x] At this time, Healthcare Decision Maker Is:        [] Primary agent named in scanned advance directive. [x] Legal Next of Kin. [] Unable to determine legal decision maker at this time. Outreaches:               [x]  Additional Outreach -  Date:  09/14/2023   (Specify Dates & special circumstances): REMINDER CALL    Outcomes: Placed reminder call to pt today re: ACP visit set for Fri Sept 15 at 11am. Left VM.       Thank you for this referral.

## 2023-09-15 ENCOUNTER — CLINICAL DOCUMENTATION (OUTPATIENT)
Dept: SPIRITUAL SERVICES | Age: 78
End: 2023-09-15

## 2023-09-15 NOTE — ACP (ADVANCE CARE PLANNING)
reach out when she is ready to proceed. Will await contact from pt and leave referral open at this time. Will check back with pt in 2-3 weeks unless pt reaches out sooner.

## 2023-11-03 LAB
ALBUMIN SERPL-MCNC: 4.4 G/DL
ALBUMIN SERPL-MCNC: 4.4 G/DL (ref 3.5–5.2)
ALK PHOSPHATASE: 59 U/L (ref 40–142)
ALP BLD-CCNC: 59 U/L
ALT SERPL-CCNC: 14 U/L
ALT SERPL-CCNC: 14 U/L (ref 5–40)
ANION GAP SERPL CALCULATED.3IONS-SCNC: 10 MEQ/L (ref 7–16)
ANION GAP SERPL CALCULATED.3IONS-SCNC: 10 MMOL/L
AST SERPL-CCNC: 16 U/L
AST SERPL-CCNC: 16 U/L (ref 9–40)
BILIRUB SERPL-MCNC: 0.4 MG/DL
BILIRUB SERPL-MCNC: 0.4 MG/DL (ref 0.1–1.4)
BUN BLDV-MCNC: 14 MG/DL
BUN BLDV-MCNC: 14 MG/DL (ref 8–23)
CALCIUM SERPL-MCNC: 9.1 MG/DL
CALCIUM SERPL-MCNC: 9.1 MG/DL (ref 8.5–10.5)
CHLORIDE BLD-SCNC: 105 MEQ/L (ref 95–107)
CHLORIDE BLD-SCNC: 105 MMOL/L
CHOLESTEROL, TOTAL: 190 MG/DL
CHOLESTEROL/HDL RATIO: 2.9
CHOLESTEROL/HDL RATIO: 2.9 RATIO
CHOLESTEROL: 190 MG/DL
CO2: 26 MEQ/L (ref 19–31)
CO2: 26 MMOL/L
CREAT SERPL-MCNC: 0.68 MG/DL
CREAT SERPL-MCNC: 0.68 MG/DL (ref 0.6–1.3)
EGFR IF NONAFRICAN AMERICAN: 90 ML/MIN/1.73
EGFR: 90
GLUCOSE BLD-MCNC: 98 MG/DL
GLUCOSE: 98 MG/DL (ref 70–99)
HDLC SERPL-MCNC: 65 MG/DL
HDLC SERPL-MCNC: 65 MG/DL (ref 35–70)
LDL CHOLESTEROL CALCULATED: 103 MG/DL
LDL CHOLESTEROL CALCULATED: 103 MG/DL (ref 0–160)
LDL/HDL RATIO: 1.6 RATIO
NONHDLC SERPL-MCNC: NORMAL MG/DL
POTASSIUM SERPL-SCNC: 4.5 MEQ/L (ref 3.5–5.4)
POTASSIUM SERPL-SCNC: 4.5 MMOL/L
SODIUM BLD-SCNC: 141 MEQ/L (ref 133–146)
SODIUM BLD-SCNC: 141 MMOL/L
TOTAL PROTEIN: 6.3
TOTAL PROTEIN: 6.3 G/DL (ref 6.1–8.3)
TRIGL SERPL-MCNC: 109 MG/DL
TRIGL SERPL-MCNC: 109 MG/DL
TSH SERPL DL<=0.05 MIU/L-ACNC: 3.35 UIU/ML
TSH SERPL DL<=0.05 MIU/L-ACNC: 3.35 UIU/ML (ref 0.4–4.1)
VLDLC SERPL CALC-MCNC: 22 MG/DL
VLDLC SERPL CALC-MCNC: 22 MG/DL

## 2023-11-07 DIAGNOSIS — E03.9 ACQUIRED HYPOTHYROIDISM: ICD-10-CM

## 2023-11-07 DIAGNOSIS — E78.5 HYPERLIPIDEMIA WITH TARGET LDL LESS THAN 130: ICD-10-CM

## 2024-01-29 ENCOUNTER — OFFICE VISIT (OUTPATIENT)
Dept: FAMILY MEDICINE CLINIC | Age: 79
End: 2024-01-29
Payer: MEDICARE

## 2024-01-29 VITALS — DIASTOLIC BLOOD PRESSURE: 80 MMHG | SYSTOLIC BLOOD PRESSURE: 124 MMHG | BODY MASS INDEX: 33.52 KG/M2 | WEIGHT: 214 LBS

## 2024-01-29 DIAGNOSIS — G47.33 OSA ON CPAP: Primary | Chronic | ICD-10-CM

## 2024-01-29 DIAGNOSIS — E78.5 HYPERLIPIDEMIA WITH TARGET LDL LESS THAN 130: ICD-10-CM

## 2024-01-29 DIAGNOSIS — E03.9 ACQUIRED HYPOTHYROIDISM: Chronic | ICD-10-CM

## 2024-01-29 DIAGNOSIS — R73.01 IFG (IMPAIRED FASTING GLUCOSE): ICD-10-CM

## 2024-01-29 DIAGNOSIS — M72.2 PLANTAR FASCIITIS OF LEFT FOOT: ICD-10-CM

## 2024-01-29 DIAGNOSIS — M17.0 BILATERAL PRIMARY OSTEOARTHRITIS OF KNEE: ICD-10-CM

## 2024-01-29 PROCEDURE — G8484 FLU IMMUNIZE NO ADMIN: HCPCS | Performed by: FAMILY MEDICINE

## 2024-01-29 PROCEDURE — G8399 PT W/DXA RESULTS DOCUMENT: HCPCS | Performed by: FAMILY MEDICINE

## 2024-01-29 PROCEDURE — G8427 DOCREV CUR MEDS BY ELIG CLIN: HCPCS | Performed by: FAMILY MEDICINE

## 2024-01-29 PROCEDURE — 1090F PRES/ABSN URINE INCON ASSESS: CPT | Performed by: FAMILY MEDICINE

## 2024-01-29 PROCEDURE — G8417 CALC BMI ABV UP PARAM F/U: HCPCS | Performed by: FAMILY MEDICINE

## 2024-01-29 PROCEDURE — 1123F ACP DISCUSS/DSCN MKR DOCD: CPT | Performed by: FAMILY MEDICINE

## 2024-01-29 PROCEDURE — 1036F TOBACCO NON-USER: CPT | Performed by: FAMILY MEDICINE

## 2024-01-29 PROCEDURE — 99214 OFFICE O/P EST MOD 30 MIN: CPT | Performed by: FAMILY MEDICINE

## 2024-01-29 RX ORDER — LEVOTHYROXINE SODIUM 0.07 MG/1
75 TABLET ORAL DAILY
Qty: 90 TABLET | Refills: 3 | Status: SHIPPED | OUTPATIENT
Start: 2024-01-29

## 2024-01-29 RX ORDER — PRAVASTATIN SODIUM 40 MG
40 TABLET ORAL DAILY
Qty: 90 TABLET | Refills: 3 | Status: SHIPPED | OUTPATIENT
Start: 2024-01-29

## 2024-01-29 ASSESSMENT — PATIENT HEALTH QUESTIONNAIRE - PHQ9
SUM OF ALL RESPONSES TO PHQ QUESTIONS 1-9: 0
SUM OF ALL RESPONSES TO PHQ QUESTIONS 1-9: 0
1. LITTLE INTEREST OR PLEASURE IN DOING THINGS: 0
SUM OF ALL RESPONSES TO PHQ QUESTIONS 1-9: 0
SUM OF ALL RESPONSES TO PHQ QUESTIONS 1-9: 0
SUM OF ALL RESPONSES TO PHQ9 QUESTIONS 1 & 2: 0
2. FEELING DOWN, DEPRESSED OR HOPELESS: 0

## 2024-01-29 ASSESSMENT — ENCOUNTER SYMPTOMS
GASTROINTESTINAL NEGATIVE: 1
SHORTNESS OF BREATH: 0
ALLERGIC/IMMUNOLOGIC NEGATIVE: 1
EYES NEGATIVE: 1
RESPIRATORY NEGATIVE: 1

## 2024-01-29 NOTE — PROGRESS NOTES
APSO Progress Note    Date:1/29/2024         Patient Name:Bell Cardenas     YOB: 1945     Age:78 y.o.    Assessment/Plan        Problem List Items Addressed This Visit          Respiratory    MACEY on CPAP - Primary (Chronic)     Patient is very compliant with CPAP therapy  Patient benefits greatly from CPAP therapy  Recommend continuing CPAP therapy            Relevant Orders    CBC with Auto Differential       Endocrine    Acquired hypothyroidism (Chronic)      At goal, continue current medications, continue current treatment plan, and medication adherence emphasized         Relevant Medications    levothyroxine (SYNTHROID) 75 MCG tablet       Musculoskeletal and Integument    Bilateral primary osteoarthritis of knee      Monitored by specialist- no acute findings meriting change in the plan  Considering replacement surgery after coming back from [place] vacation in March            Other    Hyperlipidemia with target LDL less than 130 (Chronic)      At goal, continue current medications, continue current treatment plan, medication adherence emphasized, and lifestyle modifications recommended         Relevant Medications    pravastatin (PRAVACHOL) 40 MG tablet    Other Relevant Orders    Comprehensive Metabolic Panel    Lipid Panel     Other Visit Diagnoses       Plantar fasciitis of left foot        Given home PT exercises  Use specialized shoes as needed if helping    IFG (impaired fasting glucose)        Relevant Orders    Hemoglobin A1C             Return in about 6 months (around 7/29/2024).    Electronically signed by Nishant Love DO on 1/29/24       Total time spent was between Time personally spent assessing and managing the patient on the date of service: Est: 30-39 minutes (31443) mins. This included time spent reviewing the patient's medical record (e.g., recent visits, labs, and studies); seeing the patient in the office (face-to-face time); ordering medications, studies,

## 2024-01-29 NOTE — ASSESSMENT & PLAN NOTE
At goal, continue current medications, continue current treatment plan, medication adherence emphasized, and lifestyle modifications recommended

## 2024-01-29 NOTE — ASSESSMENT & PLAN NOTE
Monitored by specialist- no acute findings meriting change in the plan  Considering replacement surgery after coming back from [place] vacation in March

## 2024-04-02 ENCOUNTER — PATIENT MESSAGE (OUTPATIENT)
Dept: FAMILY MEDICINE CLINIC | Age: 79
End: 2024-04-02

## 2024-04-02 NOTE — TELEPHONE ENCOUNTER
From: Bell Cardenas  To: Dr. Nishant Love  Sent: 4/2/2024 11:48 AM EDT  Subject: Pre-surgery question    I am scheduled for a knee replacement surgery on April 10, 2024. I am going to a [facility] pre-admission clinic today, (April 2). Am I all set with Dr. Love or do I need to see him prior to the surgery. I just returned from a vacation in [place] and need to be sure that I am all set for the surgery.

## 2024-04-03 ENCOUNTER — OFFICE VISIT (OUTPATIENT)
Dept: FAMILY MEDICINE CLINIC | Age: 79
End: 2024-04-03

## 2024-04-03 VITALS
BODY MASS INDEX: 33.36 KG/M2 | WEIGHT: 213 LBS | HEART RATE: 97 BPM | DIASTOLIC BLOOD PRESSURE: 68 MMHG | OXYGEN SATURATION: 94 % | SYSTOLIC BLOOD PRESSURE: 122 MMHG

## 2024-04-03 DIAGNOSIS — G47.33 OSA ON CPAP: Chronic | ICD-10-CM

## 2024-04-03 DIAGNOSIS — M17.11 PRIMARY OSTEOARTHRITIS OF RIGHT KNEE: ICD-10-CM

## 2024-04-03 DIAGNOSIS — Z01.818 PREOPERATIVE EXAMINATION: Primary | ICD-10-CM

## 2024-04-03 NOTE — ASSESSMENT & PLAN NOTE
Monitored by specialist- no acute findings meriting change in the plan  Replacement surgery scheduled

## 2024-04-03 NOTE — PROGRESS NOTES
Subjective:      Bell Cardenas is a 79 y.o. female who presents to the office today for a preoperative consultation at the request of surgeon Dr. Dejesus who plans on performing REPLACEMENT TOTAL JOINT KNEE RIGHT on April 10.  This consultation is requested for the specific conditions prompting preoperative evaluation (i.e. because of potential affect on operative risk): none.  Planned anesthesia is Spinal.  The patient has the following known anesthesia issues:  N/V   Patient has a bleeding risk of : no recent abnormal bleeding, no remote history of abnormal bleeding  Patient does not have objection to receiving blood products if needed.  Past Medical History:   Diagnosis Date    Arthritis     big toes    BPPV (benign paroxysmal positional vertigo)     High cholesterol     Dr. ANGELITA Montano    Hypothyroid     Macular pucker, right     Neuropathy     PONV (postoperative nausea and vomiting)     Sleep apnea     C-pap    Wears glasses      Patient Active Problem List    Diagnosis Date Noted    Chronic left shoulder pain 07/21/2022    Benign paroxysmal positional vertigo of left ear 07/21/2022    Polyarthritis 07/21/2022    Nuclear senile cataract 11/18/2014    Peripheral chorioretinal scars 11/18/2014    Vitreous degeneration 11/18/2014    Primary osteoarthritis of right knee 02/29/2024    Bilateral primary osteoarthritis of knee 07/27/2023    MACEY on CPAP 12/27/2021    Acquired hypothyroidism 08/24/2016    Hyperlipidemia with target LDL less than 130 09/13/2013     Past Surgical History:   Procedure Laterality Date    CATARACT REMOVAL WITH IMPLANT Right 2018    COLONOSCOPY      multiple    TOTAL HIP ARTHROPLASTY Left 2008    VITRECTOMY Right 09/23/2021    VITRECTOMY 25 GAUGE, MEMBRANE PEEL     VITRECTOMY Right 9/23/2021    VITRECTOMY 25 GAUGE, MEMBRANE PEEL performed by Mauri Pineda MD at [facility] OR     Family History   Problem Relation Age of Onset    Heart Failure Mother     Emphysema Mother     Heart Disease

## 2024-07-27 SDOH — ECONOMIC STABILITY: FOOD INSECURITY: WITHIN THE PAST 12 MONTHS, THE FOOD YOU BOUGHT JUST DIDN'T LAST AND YOU DIDN'T HAVE MONEY TO GET MORE.: NEVER TRUE

## 2024-07-27 SDOH — ECONOMIC STABILITY: FOOD INSECURITY: WITHIN THE PAST 12 MONTHS, YOU WORRIED THAT YOUR FOOD WOULD RUN OUT BEFORE YOU GOT MONEY TO BUY MORE.: NEVER TRUE

## 2024-07-27 SDOH — ECONOMIC STABILITY: INCOME INSECURITY: HOW HARD IS IT FOR YOU TO PAY FOR THE VERY BASICS LIKE FOOD, HOUSING, MEDICAL CARE, AND HEATING?: NOT HARD AT ALL

## 2024-07-27 SDOH — HEALTH STABILITY: PHYSICAL HEALTH: ON AVERAGE, HOW MANY DAYS PER WEEK DO YOU ENGAGE IN MODERATE TO STRENUOUS EXERCISE (LIKE A BRISK WALK)?: 4 DAYS

## 2024-07-27 SDOH — HEALTH STABILITY: PHYSICAL HEALTH: ON AVERAGE, HOW MANY MINUTES DO YOU ENGAGE IN EXERCISE AT THIS LEVEL?: 50 MIN

## 2024-07-27 ASSESSMENT — LIFESTYLE VARIABLES
HOW OFTEN DO YOU HAVE A DRINK CONTAINING ALCOHOL: 2
HOW OFTEN DO YOU HAVE SIX OR MORE DRINKS ON ONE OCCASION: 1
HOW MANY STANDARD DRINKS CONTAINING ALCOHOL DO YOU HAVE ON A TYPICAL DAY: 1 OR 2
HOW MANY STANDARD DRINKS CONTAINING ALCOHOL DO YOU HAVE ON A TYPICAL DAY: 1
HOW OFTEN DO YOU HAVE A DRINK CONTAINING ALCOHOL: MONTHLY OR LESS

## 2024-07-27 ASSESSMENT — PATIENT HEALTH QUESTIONNAIRE - PHQ9
SUM OF ALL RESPONSES TO PHQ QUESTIONS 1-9: 0
1. LITTLE INTEREST OR PLEASURE IN DOING THINGS: NOT AT ALL
2. FEELING DOWN, DEPRESSED OR HOPELESS: NOT AT ALL
SUM OF ALL RESPONSES TO PHQ QUESTIONS 1-9: 0
SUM OF ALL RESPONSES TO PHQ9 QUESTIONS 1 & 2: 0
SUM OF ALL RESPONSES TO PHQ QUESTIONS 1-9: 0
SUM OF ALL RESPONSES TO PHQ QUESTIONS 1-9: 0

## 2024-07-30 ENCOUNTER — OFFICE VISIT (OUTPATIENT)
Dept: FAMILY MEDICINE CLINIC | Age: 79
End: 2024-07-30

## 2024-07-30 VITALS — WEIGHT: 211 LBS | BODY MASS INDEX: 33.05 KG/M2 | DIASTOLIC BLOOD PRESSURE: 74 MMHG | SYSTOLIC BLOOD PRESSURE: 138 MMHG

## 2024-07-30 DIAGNOSIS — Z00.00 MEDICARE ANNUAL WELLNESS VISIT, SUBSEQUENT: Primary | ICD-10-CM

## 2024-07-30 NOTE — PROGRESS NOTES
Nishant Lemos DO   Calcium Carb-Cholecalciferol (OYSTER SHELL CALCIUM W/D) 500-5 MG-MCG TABS tablet TAKE ONE TABLET BY MOUTH DAILY Yes Nishant Love DO   Psyllium (METAMUCIL PO) Take by mouth Daily with lunch 2 tablespoons Yes Mariano Landry MD   Magnesium 400 MG TABS Take by mouth Daily with lunch Yes Mariano Landry MD   Glucosamine HCl 1000 MG TABS Take 1 tablet by mouth Daily with lunch  Yes ProviderMariano MD       CareTeam (Including outside providers/suppliers regularly involved in providing care):   Patient Care Team:  Nishant Love DO as PCP - General (Family Medicine)  Nishant Love DO as PCP - Empaneled Provider  Rustam Marin MD as Surgeon (Orthopedic Surgery)  Kenia Garg MD as Consulting Physician (Ophthalmology)      Reviewed and updated this visit:  Allergies  Meds

## 2024-09-16 LAB
ALBUMIN: 4.5 G/DL (ref 3.5–5.2)
ALK PHOSPHATASE: 73 U/L (ref 40–142)
ALT SERPL-CCNC: 13 U/L (ref 5–40)
ANION GAP SERPL CALCULATED.3IONS-SCNC: 11 MEQ/L (ref 7–16)
AST SERPL-CCNC: 19 U/L (ref 9–40)
BASOPHILS ABSOLUTE: 0.06 K/UL (ref 0–0.2)
BASOPHILS RELATIVE PERCENT: 1 %
BILIRUB SERPL-MCNC: 0.4 MG/DL
BUN BLDV-MCNC: 15 MG/DL (ref 8–23)
CALCIUM SERPL-MCNC: 9.2 MG/DL (ref 8.5–10.5)
CHLORIDE BLD-SCNC: 106 MEQ/L (ref 95–107)
CHOLESTEROL, TOTAL: 206 MG/DL
CHOLESTEROL/HDL RATIO: 3.1 RATIO
CO2: 25 MEQ/L (ref 19–31)
COMMENT: NORMAL
CREAT SERPL-MCNC: 0.78 MG/DL (ref 0.6–1.3)
EGFR IF NONAFRICAN AMERICAN: 78 ML/MIN/1.73
EOSINOPHILS ABSOLUTE: 0.12 K/UL (ref 0–0.5)
EOSINOPHILS RELATIVE PERCENT: 2 %
ESTIMATED AVERAGE GLUCOSE: 103 MG/DL
GLUCOSE: 98 MG/DL (ref 70–99)
HBA1C MFR BLD: 5.2 % (ref 4.2–5.6)
HCT VFR BLD CALC: 43.3 % (ref 34–45)
HDLC SERPL-MCNC: 66 MG/DL
HEMOGLOBIN: 14.2 G/DL (ref 11.5–15.5)
IMMATURE GRANS (ABS): 0.03
IMMATURE GRANULOCYTES %: 0.5 %
LDL CHOLESTEROL: 122 MG/DL
LDL/HDL RATIO: 1.8 RATIO
LYMPHOCYTES ABSOLUTE: 1.33 K/UL (ref 1–4)
LYMPHOCYTES RELATIVE PERCENT: 22.1 %
MCH RBC QN AUTO: 29.8 PG (ref 25–33)
MCHC RBC AUTO-ENTMCNC: 32.8 G/DL (ref 31–36)
MCV RBC AUTO: 91 FL (ref 80–99)
MONOCYTES ABSOLUTE: 0.66 K/UL (ref 0.2–1)
MONOCYTES RELATIVE PERCENT: 11 %
NEUTROPHILS ABSOLUTE: 3.82 K/UL (ref 1.5–7.5)
NEUTROPHILS RELATIVE PERCENT: 63.4 %
PDW BLD-RTO: 14.1 % (ref 11.5–15)
PLATELET # BLD: NORMAL K/UL (ref 130–400)
POTASSIUM SERPL-SCNC: 4.6 MEQ/L (ref 3.5–5.4)
RBC # BLD: 4.76 M/UL (ref 3.8–5.4)
SODIUM BLD-SCNC: 142 MEQ/L (ref 133–146)
TOTAL PROTEIN: 6.7 G/DL (ref 6.1–8.3)
TRIGL SERPL-MCNC: 91 MG/DL
VLDLC SERPL CALC-MCNC: 18 MG/DL
WBC # BLD: 6 K/UL (ref 3.5–11)

## 2024-10-02 ENCOUNTER — OFFICE VISIT (OUTPATIENT)
Dept: FAMILY MEDICINE CLINIC | Age: 79
End: 2024-10-02

## 2024-10-02 VITALS
WEIGHT: 207 LBS | DIASTOLIC BLOOD PRESSURE: 62 MMHG | BODY MASS INDEX: 32.42 KG/M2 | OXYGEN SATURATION: 97 % | HEART RATE: 85 BPM | SYSTOLIC BLOOD PRESSURE: 118 MMHG

## 2024-10-02 DIAGNOSIS — Z01.818 PREOPERATIVE EXAMINATION: Primary | ICD-10-CM

## 2024-10-02 NOTE — PROGRESS NOTES
Subjective:      Bell Cardenas is a 79 y.o. female who presents to the office today for a preoperative consultation at the request of surgeon Dr. Dejesus who plans on performing left total knee replacement on October 9.  This consultation is requested for the specific conditions prompting preoperative evaluation (i.e. because of potential affect on operative risk): none.  Planned anesthesia is Spinal.  The patient has the following known anesthesia issues:  had nausea with hip replacement   Patient has a bleeding risk of : no recent abnormal bleeding, no remote history of abnormal bleeding  Patient does not have objection to receiving blood products if needed.  Past Medical History:   Diagnosis Date    Arthritis     big toes    BPPV (benign paroxysmal positional vertigo)     High cholesterol     Dr. ANGELITA Montano    Hypothyroid     Macular pucker, right     Neuropathy     PONV (postoperative nausea and vomiting)     Sleep apnea     C-pap    Wears glasses      Patient Active Problem List    Diagnosis Date Noted    Chronic left shoulder pain 07/21/2022    Benign paroxysmal positional vertigo of left ear 07/21/2022    Polyarthritis 07/21/2022    Nuclear senile cataract 11/18/2014    Peripheral chorioretinal scars 11/18/2014    Vitreous degeneration 11/18/2014    Primary osteoarthritis of right knee 02/29/2024    Bilateral primary osteoarthritis of knee 07/27/2023    MACEY on CPAP 12/27/2021    Acquired hypothyroidism 08/24/2016    Hyperlipidemia with target LDL less than 130 09/13/2013     Past Surgical History:   Procedure Laterality Date    CATARACT REMOVAL WITH IMPLANT Right 2018    COLONOSCOPY      multiple    TOTAL HIP ARTHROPLASTY Left 2008    VITRECTOMY Right 09/23/2021    VITRECTOMY 25 GAUGE, MEMBRANE PEEL     VITRECTOMY Right 9/23/2021    VITRECTOMY 25 GAUGE, MEMBRANE PEEL performed by Mauri Pineda MD at [facility] OR     Family History   Problem Relation Age of Onset    Heart Failure Mother     Emphysema Mother

## (undated) DEVICE — SOLUTION SCRB 4OZ 10% POVIDONE IOD ANTIMIC BTL

## (undated) DEVICE — SYRINGE MED 5ML STD CLR PLAS LUERLOCK TIP N CTRL DISP

## (undated) DEVICE — CYCLOGEL 1% GTTS

## (undated) DEVICE — GOWN,AURORA,NONREINFORCED,LARGE: Brand: MEDLINE

## (undated) DEVICE — SURGICAL PROCEDURE PACK 25 GA VITRECTOMY

## (undated) DEVICE — REVOLUTION DSP 25G ILM FORCEPS: Brand: ALCON GRIESHABER REVOLUTION

## (undated) DEVICE — OCCLUDER EYE POLYETH HYPOALRG ADH TAPE W/O PINHOLE

## (undated) DEVICE — STANDARD HYPODERMIC NEEDLE,ALUMINUM HUB: Brand: MONOJECT

## (undated) DEVICE — GLOVE ORANGE PI 7   MSG9070

## (undated) DEVICE — SYRINGE MED 10ML LUERLOCK TIP W/O SFTY DISP

## (undated) DEVICE — OCCUCOAT SYRINGE 1ML 6PK: Brand: OCCUCOAT

## (undated) DEVICE — RETINA PK

## (undated) DEVICE — 25GA EZPASS SOFT TIP CANNULA BOX OF 5: Brand: VORTEX SURGICAL INC

## (undated) DEVICE — 1 EACH 40411 STERILE DISPOSABLE SUPER VIEW® LENS SET & 1 EACH 40100 STERILE MICROSCOPE DRAPE: Brand: SUPER VIEW® PACK

## (undated) DEVICE — SOLUTION IRRIG 1000ML PENTALYTE PLAS POUR BTL TIS U SOL